# Patient Record
Sex: FEMALE | Race: WHITE | NOT HISPANIC OR LATINO | Employment: FULL TIME | URBAN - NONMETROPOLITAN AREA
[De-identification: names, ages, dates, MRNs, and addresses within clinical notes are randomized per-mention and may not be internally consistent; named-entity substitution may affect disease eponyms.]

---

## 2021-01-12 ENCOUNTER — IMMUNIZATION (OUTPATIENT)
Dept: VACCINE CLINIC | Facility: HOSPITAL | Age: 41
End: 2021-01-12

## 2021-01-12 PROCEDURE — 0001A: CPT | Performed by: FAMILY MEDICINE

## 2021-01-12 PROCEDURE — 91300 HC SARSCOV02 VAC 30MCG/0.3ML IM: CPT | Performed by: FAMILY MEDICINE

## 2021-02-01 ENCOUNTER — IMMUNIZATION (OUTPATIENT)
Dept: VACCINE CLINIC | Facility: HOSPITAL | Age: 41
End: 2021-02-01

## 2021-02-01 PROCEDURE — 91300 HC SARSCOV02 VAC 30MCG/0.3ML IM: CPT | Performed by: FAMILY MEDICINE

## 2021-02-01 PROCEDURE — 0002A: CPT | Performed by: FAMILY MEDICINE

## 2024-08-19 ENCOUNTER — APPOINTMENT (OUTPATIENT)
Dept: GENERAL RADIOLOGY | Facility: HOSPITAL | Age: 44
End: 2024-08-19
Payer: COMMERCIAL

## 2024-08-19 ENCOUNTER — HOSPITAL ENCOUNTER (OUTPATIENT)
Facility: HOSPITAL | Age: 44
Setting detail: OBSERVATION
Discharge: HOME | End: 2024-08-20
Attending: EMERGENCY MEDICINE | Admitting: FAMILY MEDICINE
Payer: COMMERCIAL

## 2024-08-19 DIAGNOSIS — R07.9 CHEST PAIN IN ADULT: Primary | ICD-10-CM

## 2024-08-19 PROBLEM — I25.10 CAD (CORONARY ARTERY DISEASE): Status: ACTIVE | Noted: 2024-08-19

## 2024-08-19 PROBLEM — I25.42 SPONTANEOUS DISSECTION OF CORONARY ARTERY: Status: ACTIVE | Noted: 2024-08-19

## 2024-08-19 PROBLEM — R79.89 ELEVATED TROPONIN: Status: ACTIVE | Noted: 2024-08-19

## 2024-08-19 LAB
ALBUMIN SERPL-MCNC: 4.7 G/DL (ref 3.5–5.2)
ALBUMIN/GLOB SERPL: 1.6 G/DL
ALP SERPL-CCNC: 110 U/L (ref 39–117)
ALT SERPL W P-5'-P-CCNC: 24 U/L (ref 1–33)
ANION GAP SERPL CALCULATED.3IONS-SCNC: 12 MMOL/L (ref 5–15)
AST SERPL-CCNC: 25 U/L (ref 1–32)
BASOPHILS # BLD AUTO: 0.07 10*3/MM3 (ref 0–0.2)
BASOPHILS NFR BLD AUTO: 0.7 % (ref 0–1.5)
BILIRUB SERPL-MCNC: 0.2 MG/DL (ref 0–1.2)
BUN SERPL-MCNC: 11 MG/DL (ref 6–20)
BUN/CREAT SERPL: 13.8 (ref 7–25)
CALCIUM SPEC-SCNC: 9.3 MG/DL (ref 8.6–10.5)
CHLORIDE SERPL-SCNC: 101 MMOL/L (ref 98–107)
CO2 SERPL-SCNC: 27 MMOL/L (ref 22–29)
CREAT SERPL-MCNC: 0.8 MG/DL (ref 0.57–1)
D DIMER PPP FEU-MCNC: 0.47 MCGFEU/ML (ref 0–0.5)
DEPRECATED RDW RBC AUTO: 42.8 FL (ref 37–54)
EGFRCR SERPLBLD CKD-EPI 2021: 94.5 ML/MIN/1.73
EOSINOPHIL # BLD AUTO: 0.27 10*3/MM3 (ref 0–0.4)
EOSINOPHIL NFR BLD AUTO: 2.9 % (ref 0.3–6.2)
ERYTHROCYTE [DISTWIDTH] IN BLOOD BY AUTOMATED COUNT: 12.9 % (ref 12.3–15.4)
GEN 5 2HR TROPONIN T REFLEX: 69 NG/L
GLOBULIN UR ELPH-MCNC: 2.9 GM/DL
GLUCOSE SERPL-MCNC: 130 MG/DL (ref 65–99)
HCT VFR BLD AUTO: 39.9 % (ref 34–46.6)
HGB BLD-MCNC: 12.9 G/DL (ref 12–15.9)
HOLD SPECIMEN: NORMAL
IMM GRANULOCYTES # BLD AUTO: 0.06 10*3/MM3 (ref 0–0.05)
IMM GRANULOCYTES NFR BLD AUTO: 0.6 % (ref 0–0.5)
LIPASE SERPL-CCNC: 35 U/L (ref 13–60)
LYMPHOCYTES # BLD AUTO: 3 10*3/MM3 (ref 0.7–3.1)
LYMPHOCYTES NFR BLD AUTO: 32 % (ref 19.6–45.3)
MCH RBC QN AUTO: 29.4 PG (ref 26.6–33)
MCHC RBC AUTO-ENTMCNC: 32.3 G/DL (ref 31.5–35.7)
MCV RBC AUTO: 90.9 FL (ref 79–97)
MONOCYTES # BLD AUTO: 0.62 10*3/MM3 (ref 0.1–0.9)
MONOCYTES NFR BLD AUTO: 6.6 % (ref 5–12)
NEUTROPHILS NFR BLD AUTO: 5.35 10*3/MM3 (ref 1.7–7)
NEUTROPHILS NFR BLD AUTO: 57.2 % (ref 42.7–76)
NRBC BLD AUTO-RTO: 0 /100 WBC (ref 0–0.2)
NT-PROBNP SERPL-MCNC: 639.1 PG/ML (ref 0–450)
PLATELET # BLD AUTO: 417 10*3/MM3 (ref 140–450)
PMV BLD AUTO: 9.9 FL (ref 6–12)
POTASSIUM SERPL-SCNC: 3.7 MMOL/L (ref 3.5–5.2)
PROT SERPL-MCNC: 7.6 G/DL (ref 6–8.5)
QT INTERVAL: 360 MS
QTC INTERVAL: 442 MS
RBC # BLD AUTO: 4.39 10*6/MM3 (ref 3.77–5.28)
SODIUM SERPL-SCNC: 140 MMOL/L (ref 136–145)
TROPONIN T DELTA: -9 NG/L
TROPONIN T SERPL HS-MCNC: 78 NG/L
WBC NRBC COR # BLD AUTO: 9.37 10*3/MM3 (ref 3.4–10.8)
WHOLE BLOOD HOLD COAG: NORMAL
WHOLE BLOOD HOLD SPECIMEN: NORMAL

## 2024-08-19 PROCEDURE — 83880 ASSAY OF NATRIURETIC PEPTIDE: CPT | Performed by: EMERGENCY MEDICINE

## 2024-08-19 PROCEDURE — 85379 FIBRIN DEGRADATION QUANT: CPT | Performed by: EMERGENCY MEDICINE

## 2024-08-19 PROCEDURE — 80053 COMPREHEN METABOLIC PANEL: CPT | Performed by: EMERGENCY MEDICINE

## 2024-08-19 PROCEDURE — 25010000002 ENOXAPARIN PER 10 MG: Performed by: EMERGENCY MEDICINE

## 2024-08-19 PROCEDURE — 85025 COMPLETE CBC W/AUTO DIFF WBC: CPT | Performed by: EMERGENCY MEDICINE

## 2024-08-19 PROCEDURE — 25810000003 LACTATED RINGERS SOLUTION: Performed by: EMERGENCY MEDICINE

## 2024-08-19 PROCEDURE — 99223 1ST HOSP IP/OBS HIGH 75: CPT | Performed by: INTERNAL MEDICINE

## 2024-08-19 PROCEDURE — 99285 EMERGENCY DEPT VISIT HI MDM: CPT

## 2024-08-19 PROCEDURE — 96372 THER/PROPH/DIAG INJ SC/IM: CPT

## 2024-08-19 PROCEDURE — 93005 ELECTROCARDIOGRAM TRACING: CPT | Performed by: EMERGENCY MEDICINE

## 2024-08-19 PROCEDURE — 84484 ASSAY OF TROPONIN QUANT: CPT | Performed by: EMERGENCY MEDICINE

## 2024-08-19 PROCEDURE — 36415 COLL VENOUS BLD VENIPUNCTURE: CPT

## 2024-08-19 PROCEDURE — 83690 ASSAY OF LIPASE: CPT | Performed by: EMERGENCY MEDICINE

## 2024-08-19 PROCEDURE — 99418 PROLNG IP/OBS E/M EA 15 MIN: CPT | Performed by: INTERNAL MEDICINE

## 2024-08-19 PROCEDURE — 71045 X-RAY EXAM CHEST 1 VIEW: CPT

## 2024-08-19 RX ORDER — ASPIRIN 81 MG/1
81 TABLET, CHEWABLE ORAL DAILY
COMMUNITY
Start: 2024-08-10 | End: 2024-11-08

## 2024-08-19 RX ORDER — MORPHINE SULFATE 2 MG/ML
2 INJECTION, SOLUTION INTRAMUSCULAR; INTRAVENOUS
Status: DISCONTINUED | OUTPATIENT
Start: 2024-08-19 | End: 2024-08-20 | Stop reason: HOSPADM

## 2024-08-19 RX ORDER — HYDROXYZINE HYDROCHLORIDE 25 MG/1
25 TABLET, FILM COATED ORAL ONCE
Status: COMPLETED | OUTPATIENT
Start: 2024-08-19 | End: 2024-08-19

## 2024-08-19 RX ORDER — NITROGLYCERIN 0.4 MG/1
0.4 TABLET SUBLINGUAL
COMMUNITY
Start: 2024-08-09

## 2024-08-19 RX ORDER — ATORVASTATIN CALCIUM 40 MG/1
40 TABLET, FILM COATED ORAL DAILY
COMMUNITY
Start: 2024-08-10 | End: 2024-11-08

## 2024-08-19 RX ORDER — ASPIRIN 81 MG/1
324 TABLET, CHEWABLE ORAL ONCE
Status: COMPLETED | OUTPATIENT
Start: 2024-08-19 | End: 2024-08-19

## 2024-08-19 RX ORDER — NITROGLYCERIN 0.4 MG/1
0.4 TABLET SUBLINGUAL
Status: DISCONTINUED | OUTPATIENT
Start: 2024-08-19 | End: 2024-08-20

## 2024-08-19 RX ORDER — NITROGLYCERIN 20 MG/100ML
5-200 INJECTION INTRAVENOUS
Status: DISCONTINUED | OUTPATIENT
Start: 2024-08-19 | End: 2024-08-19

## 2024-08-19 RX ORDER — ENOXAPARIN SODIUM 100 MG/ML
1 INJECTION SUBCUTANEOUS ONCE
Status: COMPLETED | OUTPATIENT
Start: 2024-08-19 | End: 2024-08-19

## 2024-08-19 RX ORDER — ESCITALOPRAM OXALATE 10 MG/1
10 TABLET ORAL DAILY
COMMUNITY
Start: 2024-01-25

## 2024-08-19 RX ORDER — SODIUM CHLORIDE 9 MG/ML
75 INJECTION, SOLUTION INTRAVENOUS CONTINUOUS
Status: DISCONTINUED | OUTPATIENT
Start: 2024-08-20 | End: 2024-08-20 | Stop reason: HOSPADM

## 2024-08-19 RX ORDER — SODIUM CHLORIDE 0.9 % (FLUSH) 0.9 %
10 SYRINGE (ML) INJECTION AS NEEDED
Status: DISCONTINUED | OUTPATIENT
Start: 2024-08-19 | End: 2024-08-20 | Stop reason: HOSPADM

## 2024-08-19 RX ORDER — BUPROPION HYDROCHLORIDE 150 MG/1
150 TABLET, EXTENDED RELEASE ORAL DAILY
COMMUNITY

## 2024-08-19 RX ADMIN — ENOXAPARIN SODIUM 70 MG: 80 INJECTION SUBCUTANEOUS at 22:47

## 2024-08-19 RX ADMIN — SODIUM CHLORIDE, POTASSIUM CHLORIDE, SODIUM LACTATE AND CALCIUM CHLORIDE 1000 ML: 600; 310; 30; 20 INJECTION, SOLUTION INTRAVENOUS at 22:00

## 2024-08-19 RX ADMIN — NITROGLYCERIN 0.4 MG: 0.4 TABLET SUBLINGUAL at 21:05

## 2024-08-19 RX ADMIN — ASPIRIN 324 MG: 81 TABLET, CHEWABLE ORAL at 19:48

## 2024-08-19 RX ADMIN — HYDROXYZINE HYDROCHLORIDE 25 MG: 25 TABLET, FILM COATED ORAL at 21:38

## 2024-08-19 NOTE — Clinical Note
Level of Care: Telemetry [5]   Diagnosis: Chest pain [500480]   Admitting Physician: GABY BROWN III [[id number]]   Attending Physician: GABY BROWN III [[id number]]   Bed Request Comments: tele obs not cdu

## 2024-08-19 NOTE — ED PROVIDER NOTES
Subjective   History of Present Illness  Patient presents for evaluation of symptoms including chest pain radiating to the left arm described as a crushing sensation.  This is similar to a recent event that she had a couple of weeks ago and was subsequently diagnosed with a spontaneous coronary artery dissection and underwent stent placement at [facility] in [facility] [place] [facility].  Patient had not had recurrent symptoms until today.  It happened not long after she was doing Pilates but she was not physically exerting herself specifically at the time the pain started.  She has had her aspirin and her Plavix today and has been compliant with his medications.  Has not taken any other medicines tonight.  No difficulty breathing.    History provided by:  Patient      Review of Systems    Past Medical History:   Diagnosis Date    History of hepatitis A vaccination     History of hepatitis B vaccination     Hx of esophagogastroduodenoscopy     - normal with the exception of hypotensive LES    Transverse sinus thrombosis     - etiology ocp's    Varicella infection, resolved        No Known Allergies    Past Surgical History:   Procedure Laterality Date     SECTION      10/11 and     RHINOPLASTY          TUBAL ABDOMINAL LIGATION             Family History   Problem Relation Age of Onset    Basal cell carcinoma Mother     Diabetes Mother         borderline    Hyperlipidemia Mother     Colon polyps Father     Diabetes type II Father     Hypertension Father     Stomach cancer Maternal Grandfather        Social History     Socioeconomic History    Marital status:    Tobacco Use    Smoking status: Never    Smokeless tobacco: Never   Substance and Sexual Activity    Alcohol use: Yes     Alcohol/week: 1.0 standard drink of alcohol     Types: 1 Glasses of wine per week    Drug use: Never    Sexual activity: Defer           Objective   Physical Exam  Constitutional:       General: She is not in  acute distress.  HENT:      Head: Normocephalic and atraumatic.   Eyes:      Conjunctiva/sclera: Conjunctivae normal.      Pupils: Pupils are equal, round, and reactive to light.   Cardiovascular:      Rate and Rhythm: Normal rate and regular rhythm.      Pulses: Normal pulses.      Heart sounds: No murmur heard.     No gallop.   Pulmonary:      Effort: Pulmonary effort is normal. No respiratory distress.   Abdominal:      General: Abdomen is flat. There is no distension.      Tenderness: There is no abdominal tenderness. There is no guarding or rebound.   Musculoskeletal:         General: No swelling or deformity. Normal range of motion.   Skin:     General: Skin is warm and dry.      Capillary Refill: Capillary refill takes less than 2 seconds.   Neurological:      General: No focal deficit present.      Mental Status: She is alert and oriented to person, place, and time.   Psychiatric:         Mood and Affect: Mood normal.         Behavior: Behavior normal.         Procedures           ED Course  ED Course as of 08/19/24 2346   Mon Aug 19, 2024   1944 Twelve-lead ECG independently interpreted by myself demonstrates normal sinus rhythm, no ST segment elevation or depression.  Q waves in lead III. [KB]   2156 Laboratory workup independently interpreted by myself notable for elevated high-sensitivity troponin, elevated proBNP [KB]   2156 Chest x-ray independently interpreted by myself demonstrates no acute cardiopulmonary abnormality [KB]      ED Course User Index  [KB] Roshan Pate MD                HEART Score: 5                              Medical Decision Making  Differential diagnosis includes acute coronary syndrome, pneumonia, pneumothorax, aortic dissection, pulmonary embolism.  Lab and imaging studies were conducted.  Aspirin 325 mg was administered and sublingual nitroglycerin was given.    Patient remains clinically stable in the emergency department.  She did have some improvement but not resolution  of her symptoms after administration of aspirin and nitro.  She will be admitted for further management    Problems Addressed:  Chest pain in adult: complicated acute illness or injury that poses a threat to life or bodily functions    Amount and/or Complexity of Data Reviewed  Independent Historian:      Details: Patient's  at the bedside provides some details of HPI  External Data Reviewed: notes.     Details: 8/10/2024 reviewed most recent discharge summary where patient was hospitalized for acute coronary syndrome likely due to a spontaneous coronary artery dissection it does not seem like this was a definitive diagnosis based on patient's discharge summary  Labs: ordered. Decision-making details documented in ED Course.  Radiology: ordered and independent interpretation performed. Decision-making details documented in ED Course.  ECG/medicine tests: ordered and independent interpretation performed. Decision-making details documented in ED Course.  Discussion of management or test interpretation with external provider(s): Consulted with on-call cardiologist who recommended administration of therapeutic Lovenox, nitroglycerin drip, admission to the hospital.  Lovenox was ordered and administered.  Given patient's low normal blood pressures I do not believe she would tolerate a nitroglycerin drip at this time but will start if blood pressures rise.  Hospital medicine consulted for admission    Risk  OTC drugs.  Prescription drug management.  Decision regarding hospitalization.        Final diagnoses:   Chest pain in adult       ED Disposition  ED Disposition       ED Disposition   Decision to Admit    Condition   --    Comment   --             Recent Results (from the past 24 hour(s))   ECG 12 Lead ED Triage Standing Order; Chest Pain    Collection Time: 08/19/24  7:41 PM   Result Value Ref Range    QT Interval 360 ms    QTC Interval 442 ms   High Sensitivity Troponin T    Collection Time: 08/19/24  7:47 PM     Specimen: Blood   Result Value Ref Range    HS Troponin T 78 (C) <14 ng/L   Comprehensive Metabolic Panel    Collection Time: 08/19/24  7:47 PM    Specimen: Blood   Result Value Ref Range    Glucose 130 (H) 65 - 99 mg/dL    BUN 11 6 - 20 mg/dL    Creatinine 0.80 0.57 - 1.00 mg/dL    Sodium 140 136 - 145 mmol/L    Potassium 3.7 3.5 - 5.2 mmol/L    Chloride 101 98 - 107 mmol/L    CO2 27.0 22.0 - 29.0 mmol/L    Calcium 9.3 8.6 - 10.5 mg/dL    Total Protein 7.6 6.0 - 8.5 g/dL    Albumin 4.7 3.5 - 5.2 g/dL    ALT (SGPT) 24 1 - 33 U/L    AST (SGOT) 25 1 - 32 U/L    Alkaline Phosphatase 110 39 - 117 U/L    Total Bilirubin 0.2 0.0 - 1.2 mg/dL    Globulin 2.9 gm/dL    A/G Ratio 1.6 g/dL    BUN/Creatinine Ratio 13.8 7.0 - 25.0    Anion Gap 12.0 5.0 - 15.0 mmol/L    eGFR 94.5 >60.0 mL/min/1.73   Lipase    Collection Time: 08/19/24  7:47 PM    Specimen: Blood   Result Value Ref Range    Lipase 35 13 - 60 U/L   BNP    Collection Time: 08/19/24  7:47 PM    Specimen: Blood   Result Value Ref Range    proBNP 639.1 (H) 0.0 - 450.0 pg/mL   Green Top (Gel)    Collection Time: 08/19/24  7:47 PM   Result Value Ref Range    Extra Tube Hold for add-ons.    Lavender Top    Collection Time: 08/19/24  7:47 PM   Result Value Ref Range    Extra Tube hold for add-on    Gold Top - SST    Collection Time: 08/19/24  7:47 PM   Result Value Ref Range    Extra Tube Hold for add-ons.    Gray Top    Collection Time: 08/19/24  7:47 PM   Result Value Ref Range    Extra Tube Hold for add-ons.    Light Blue Top    Collection Time: 08/19/24  7:47 PM   Result Value Ref Range    Extra Tube Hold for add-ons.    CBC Auto Differential    Collection Time: 08/19/24  7:47 PM    Specimen: Blood   Result Value Ref Range    WBC 9.37 3.40 - 10.80 10*3/mm3    RBC 4.39 3.77 - 5.28 10*6/mm3    Hemoglobin 12.9 12.0 - 15.9 g/dL    Hematocrit 39.9 34.0 - 46.6 %    MCV 90.9 79.0 - 97.0 fL    MCH 29.4 26.6 - 33.0 pg    MCHC 32.3 31.5 - 35.7 g/dL    RDW 12.9 12.3 - 15.4 %     RDW-SD 42.8 37.0 - 54.0 fl    MPV 9.9 6.0 - 12.0 fL    Platelets 417 140 - 450 10*3/mm3    Neutrophil % 57.2 42.7 - 76.0 %    Lymphocyte % 32.0 19.6 - 45.3 %    Monocyte % 6.6 5.0 - 12.0 %    Eosinophil % 2.9 0.3 - 6.2 %    Basophil % 0.7 0.0 - 1.5 %    Immature Grans % 0.6 (H) 0.0 - 0.5 %    Neutrophils, Absolute 5.35 1.70 - 7.00 10*3/mm3    Lymphocytes, Absolute 3.00 0.70 - 3.10 10*3/mm3    Monocytes, Absolute 0.62 0.10 - 0.90 10*3/mm3    Eosinophils, Absolute 0.27 0.00 - 0.40 10*3/mm3    Basophils, Absolute 0.07 0.00 - 0.20 10*3/mm3    Immature Grans, Absolute 0.06 (H) 0.00 - 0.05 10*3/mm3    nRBC 0.0 0.0 - 0.2 /100 WBC   D-dimer, Quantitative    Collection Time: 08/19/24  7:47 PM    Specimen: Blood   Result Value Ref Range    D-Dimer, Quantitative 0.47 0.00 - 0.50 MCGFEU/mL   ECG 12 Lead ED Triage Standing Order; Chest Pain    Collection Time: 08/19/24  9:47 PM   Result Value Ref Range    QT Interval 388 ms    QTC Interval 442 ms   High Sensitivity Troponin T 2Hr    Collection Time: 08/19/24  9:53 PM    Specimen: Blood   Result Value Ref Range    HS Troponin T 69 (C) <14 ng/L    Troponin T Delta -9 (L) >=-4 - <+4 ng/L     Note: In addition to lab results from this visit, the labs listed above may include labs taken at another facility or during a different encounter within the last 24 hours. Please correlate lab times with ED admission and discharge times for further clarification of the services performed during this visit.    XR Chest 1 View   Final Result   Impression:   No acute cardiopulmonary abnormality is identified         Electronically Signed: Coral Saab     8/19/2024 8:16 PM EDT     Workstation ID: [id number]        Vitals:    08/19/24 2230 08/19/24 2258 08/19/24 2313 08/19/24 2332   BP: 90/60 95/51 96/60 91/64   Pulse: 75 75 73 72   Resp:       Temp:       SpO2: 97% 97% 96% 96%   Weight:       Height:         Medications   sodium chloride 0.9 % flush 10 mL (has no administration in time range)    nitroglycerin (NITROSTAT) SL tablet 0.4 mg (0.4 mg Sublingual Given 8/19/24 2105)   nitroglycerin (TRIDIL) 200 mcg/ml infusion (0 mcg/min Intravenous Hold 8/19/24 2248)   aspirin chewable tablet 324 mg (324 mg Oral Given 8/19/24 1948)   hydrOXYzine (ATARAX) tablet 25 mg (25 mg Oral Given 8/19/24 2138)   lactated ringers bolus 1,000 mL (0 mL Intravenous Stopped 8/19/24 2320)   Enoxaparin Sodium (LOVENOX) syringe 70 mg (70 mg Subcutaneous Given 8/19/24 2247)     ECG/EMG Results (last 24 hours)       Procedure Component Value Units Date/Time    ECG 12 Lead ED Triage Standing Order; Chest Pain [[id number]] Collected: 08/19/24 1941     Updated: 08/19/24 1945     QT Interval 360 ms      QTC Interval 442 ms     Narrative:      Test Reason : ED Triage Standing Order~  Blood Pressure :   */*   mmHG  Vent. Rate :  91 BPM     Atrial Rate :  91 BPM     P-R Int : 136 ms          QRS Dur :  82 ms      QT Int : 360 ms       P-R-T Axes :   1  35  41 degrees     QTc Int : 442 ms    Normal sinus rhythm  Low voltage QRS  Possible Inferior infarct , age undetermined  Cannot rule out Anterior infarct , age undetermined  Abnormal ECG  When compared with ECG of 24-MAY-2016 00:08,  Minimal criteria for Anterior infarct are now present  Borderline criteria for Inferior infarct are now present  T wave inversion no longer evident in Inferior leads  Confirmed by MD DANIEL KYLE ([id number]) on 8/19/2024 7:45:04 PM    Referred By: EDMD           Confirmed By: CORAL DANIEL MD    ECG 12 Lead ED Triage Standing Order; Chest Pain [[id number]] Collected: 08/19/24 2147     Updated: 08/19/24 2147     QT Interval 388 ms      QTC Interval 442 ms     Narrative:      Test Reason : ED Triage Standing Order~  Blood Pressure :   */*   mmHG  Vent. Rate :  78 BPM     Atrial Rate :  78 BPM     P-R Int : 146 ms          QRS Dur :  92 ms      QT Int : 388 ms       P-R-T Axes : -11  31  43 degrees     QTc Int : 442 ms    Normal sinus rhythm  Septal infarct (cited on  or before 19-AUG-2024)  Possible Inferior infarct (cited on or before 19-AUG-2024)  Abnormal ECG  When compared with ECG of 19-AUG-2024 19:41,  Questionable change in initial forces of Anteroseptal leads    Referred By: EDMD           Confirmed By:           ECG 12 Lead ED Triage Standing Order; Chest Pain   Preliminary Result   Test Reason : ED Triage Standing Order~   Blood Pressure :   */*   mmHG   Vent. Rate :  78 BPM     Atrial Rate :  78 BPM      P-R Int : 146 ms          QRS Dur :  92 ms       QT Int : 388 ms       P-R-T Axes : -11  31  43 degrees      QTc Int : 442 ms      Normal sinus rhythm   Septal infarct (cited on or before 19-AUG-2024)   Possible Inferior infarct (cited on or before 19-AUG-2024)   Abnormal ECG   When compared with ECG of 19-AUG-2024 19:41,   Questionable change in initial forces of Anteroseptal leads      Referred By: EDMD           Confirmed By:       ECG 12 Lead ED Triage Standing Order; Chest Pain   Final Result   Test Reason : ED Triage Standing Order~   Blood Pressure :   */*   mmHG   Vent. Rate :  91 BPM     Atrial Rate :  91 BPM      P-R Int : 136 ms          QRS Dur :  82 ms       QT Int : 360 ms       P-R-T Axes :   1  35  41 degrees      QTc Int : 442 ms      Normal sinus rhythm   Low voltage QRS   Possible Inferior infarct , age undetermined   Cannot rule out Anterior infarct , age undetermined   Abnormal ECG   When compared with ECG of 24-MAY-2016 00:08,   Minimal criteria for Anterior infarct are now present   Borderline criteria for Inferior infarct are now present   T wave inversion no longer evident in Inferior leads   Confirmed by MD DANIEL KYLE ([id number]) on 8/19/2024 7:45:04 PM      Referred By: MACARENA           Confirmed By: CORAL DANIEL MD              No follow-up provider specified.       Medication List      No changes were made to your prescriptions during this visit.            Coral Daniel MD  08/19/24 9677

## 2024-08-20 ENCOUNTER — APPOINTMENT (OUTPATIENT)
Dept: CARDIOLOGY | Facility: HOSPITAL | Age: 44
End: 2024-08-20
Payer: COMMERCIAL

## 2024-08-20 VITALS
DIASTOLIC BLOOD PRESSURE: 51 MMHG | BODY MASS INDEX: 24.01 KG/M2 | WEIGHT: 153 LBS | OXYGEN SATURATION: 96 % | SYSTOLIC BLOOD PRESSURE: 87 MMHG | RESPIRATION RATE: 18 BRPM | HEART RATE: 77 BPM | TEMPERATURE: 97.7 F | HEIGHT: 67 IN

## 2024-08-20 PROBLEM — R79.89 ELEVATED TROPONIN: Status: RESOLVED | Noted: 2024-08-19 | Resolved: 2024-08-20

## 2024-08-20 PROBLEM — R07.9 CHEST PAIN: Status: RESOLVED | Noted: 2024-08-19 | Resolved: 2024-08-20

## 2024-08-20 PROBLEM — F41.8 ANXIETY ASSOCIATED WITH DEPRESSION: Status: ACTIVE | Noted: 2024-08-20

## 2024-08-20 LAB
ANION GAP SERPL CALCULATED.3IONS-SCNC: 7 MMOL/L (ref 5–15)
BASOPHILS # BLD AUTO: 0.06 10*3/MM3 (ref 0–0.2)
BASOPHILS NFR BLD AUTO: 0.7 % (ref 0–1.5)
BH CV ECHO MEAS - EDV(CUBED): 74.1 ML
BH CV ECHO MEAS - EDV(MOD-SP2): 126 ML
BH CV ECHO MEAS - EDV(MOD-SP4): 119 ML
BH CV ECHO MEAS - EF(MOD-BP): 46.7 %
BH CV ECHO MEAS - EF(MOD-SP2): 49.3 %
BH CV ECHO MEAS - EF(MOD-SP4): 43.8 %
BH CV ECHO MEAS - ESV(CUBED): 27 ML
BH CV ECHO MEAS - ESV(MOD-SP2): 63.9 ML
BH CV ECHO MEAS - ESV(MOD-SP4): 66.9 ML
BH CV ECHO MEAS - FS: 28.6 %
BH CV ECHO MEAS - IVS/LVPW: 1 CM
BH CV ECHO MEAS - IVSD: 0.9 CM
BH CV ECHO MEAS - LV DIASTOLIC VOL/BSA (35-75): 65.9 CM2
BH CV ECHO MEAS - LV MASS(C)D: 118.7 GRAMS
BH CV ECHO MEAS - LV SYSTOLIC VOL/BSA (12-30): 37.1 CM2
BH CV ECHO MEAS - LVIDD: 4.2 CM
BH CV ECHO MEAS - LVIDS: 3 CM
BH CV ECHO MEAS - LVPWD: 0.9 CM
BH CV ECHO MEAS - SV(MOD-SP2): 62.1 ML
BH CV ECHO MEAS - SV(MOD-SP4): 52.1 ML
BH CV ECHO MEAS - SVI(MOD-SP2): 34.4 ML/M2
BH CV ECHO MEAS - SVI(MOD-SP4): 28.9 ML/M2
BH CV VAS BP LEFT ARM: NORMAL MMHG
BUN SERPL-MCNC: 12 MG/DL (ref 6–20)
BUN/CREAT SERPL: 17.9 (ref 7–25)
CALCIUM SPEC-SCNC: 8.3 MG/DL (ref 8.6–10.5)
CHLORIDE SERPL-SCNC: 99 MMOL/L (ref 98–107)
CO2 SERPL-SCNC: 27 MMOL/L (ref 22–29)
CREAT SERPL-MCNC: 0.67 MG/DL (ref 0.57–1)
DEPRECATED RDW RBC AUTO: 42.7 FL (ref 37–54)
EGFRCR SERPLBLD CKD-EPI 2021: 112.1 ML/MIN/1.73
EOSINOPHIL # BLD AUTO: 0.3 10*3/MM3 (ref 0–0.4)
EOSINOPHIL NFR BLD AUTO: 3.5 % (ref 0.3–6.2)
ERYTHROCYTE [DISTWIDTH] IN BLOOD BY AUTOMATED COUNT: 12.9 % (ref 12.3–15.4)
GLUCOSE SERPL-MCNC: 84 MG/DL (ref 65–99)
HCT VFR BLD AUTO: 34.4 % (ref 34–46.6)
HGB BLD-MCNC: 11.1 G/DL (ref 12–15.9)
IMM GRANULOCYTES # BLD AUTO: 0.05 10*3/MM3 (ref 0–0.05)
IMM GRANULOCYTES NFR BLD AUTO: 0.6 % (ref 0–0.5)
LYMPHOCYTES # BLD AUTO: 3.35 10*3/MM3 (ref 0.7–3.1)
LYMPHOCYTES NFR BLD AUTO: 39.6 % (ref 19.6–45.3)
MAGNESIUM SERPL-MCNC: 1.8 MG/DL (ref 1.6–2.6)
MCH RBC QN AUTO: 29.3 PG (ref 26.6–33)
MCHC RBC AUTO-ENTMCNC: 32.3 G/DL (ref 31.5–35.7)
MCV RBC AUTO: 90.8 FL (ref 79–97)
MONOCYTES # BLD AUTO: 0.65 10*3/MM3 (ref 0.1–0.9)
MONOCYTES NFR BLD AUTO: 7.7 % (ref 5–12)
NEUTROPHILS NFR BLD AUTO: 4.05 10*3/MM3 (ref 1.7–7)
NEUTROPHILS NFR BLD AUTO: 47.9 % (ref 42.7–76)
NRBC BLD AUTO-RTO: 0 /100 WBC (ref 0–0.2)
PLATELET # BLD AUTO: 361 10*3/MM3 (ref 140–450)
PMV BLD AUTO: 9.9 FL (ref 6–12)
POTASSIUM SERPL-SCNC: 3.9 MMOL/L (ref 3.5–5.2)
QT INTERVAL: 388 MS
QTC INTERVAL: 442 MS
RBC # BLD AUTO: 3.79 10*6/MM3 (ref 3.77–5.28)
SODIUM SERPL-SCNC: 133 MMOL/L (ref 136–145)
TROPONIN T SERPL HS-MCNC: 63 NG/L
WBC NRBC COR # BLD AUTO: 8.46 10*3/MM3 (ref 3.4–10.8)

## 2024-08-20 PROCEDURE — 25810000003 SODIUM CHLORIDE 0.9 % SOLUTION: Performed by: NURSE PRACTITIONER

## 2024-08-20 PROCEDURE — G0378 HOSPITAL OBSERVATION PER HR: HCPCS

## 2024-08-20 PROCEDURE — 99204 OFFICE O/P NEW MOD 45 MIN: CPT | Performed by: INTERNAL MEDICINE

## 2024-08-20 PROCEDURE — 25010000002 MORPHINE PER 10 MG: Performed by: INTERNAL MEDICINE

## 2024-08-20 PROCEDURE — 96374 THER/PROPH/DIAG INJ IV PUSH: CPT

## 2024-08-20 PROCEDURE — 25010000002 SULFUR HEXAFLUORIDE MICROSPH 60.7-25 MG RECONSTITUTED SUSPENSION: Performed by: PHYSICIAN ASSISTANT

## 2024-08-20 PROCEDURE — 84484 ASSAY OF TROPONIN QUANT: CPT | Performed by: FAMILY MEDICINE

## 2024-08-20 PROCEDURE — 83735 ASSAY OF MAGNESIUM: CPT | Performed by: NURSE PRACTITIONER

## 2024-08-20 PROCEDURE — 93308 TTE F-UP OR LMTD: CPT

## 2024-08-20 PROCEDURE — 93308 TTE F-UP OR LMTD: CPT | Performed by: INTERNAL MEDICINE

## 2024-08-20 PROCEDURE — 80048 BASIC METABOLIC PNL TOTAL CA: CPT | Performed by: NURSE PRACTITIONER

## 2024-08-20 PROCEDURE — 85025 COMPLETE CBC W/AUTO DIFF WBC: CPT | Performed by: NURSE PRACTITIONER

## 2024-08-20 PROCEDURE — 99239 HOSP IP/OBS DSCHRG MGMT >30: CPT | Performed by: FAMILY MEDICINE

## 2024-08-20 RX ORDER — ONDANSETRON 2 MG/ML
4 INJECTION INTRAMUSCULAR; INTRAVENOUS EVERY 6 HOURS PRN
Status: DISCONTINUED | OUTPATIENT
Start: 2024-08-20 | End: 2024-08-20 | Stop reason: HOSPADM

## 2024-08-20 RX ORDER — SODIUM CHLORIDE 0.9 % (FLUSH) 0.9 %
10 SYRINGE (ML) INJECTION EVERY 12 HOURS SCHEDULED
Status: DISCONTINUED | OUTPATIENT
Start: 2024-08-20 | End: 2024-08-20 | Stop reason: HOSPADM

## 2024-08-20 RX ORDER — ESCITALOPRAM OXALATE 10 MG/1
10 TABLET ORAL DAILY
Status: DISCONTINUED | OUTPATIENT
Start: 2024-08-20 | End: 2024-08-20 | Stop reason: HOSPADM

## 2024-08-20 RX ORDER — BISACODYL 10 MG
10 SUPPOSITORY, RECTAL RECTAL DAILY PRN
Status: DISCONTINUED | OUTPATIENT
Start: 2024-08-20 | End: 2024-08-20 | Stop reason: HOSPADM

## 2024-08-20 RX ORDER — SODIUM CHLORIDE 9 MG/ML
40 INJECTION, SOLUTION INTRAVENOUS AS NEEDED
Status: DISCONTINUED | OUTPATIENT
Start: 2024-08-20 | End: 2024-08-20 | Stop reason: HOSPADM

## 2024-08-20 RX ORDER — BUPROPION HYDROCHLORIDE 150 MG/1
150 TABLET, EXTENDED RELEASE ORAL DAILY
Status: DISCONTINUED | OUTPATIENT
Start: 2024-08-20 | End: 2024-08-20 | Stop reason: HOSPADM

## 2024-08-20 RX ORDER — BISACODYL 5 MG/1
5 TABLET, DELAYED RELEASE ORAL DAILY PRN
Status: DISCONTINUED | OUTPATIENT
Start: 2024-08-20 | End: 2024-08-20 | Stop reason: HOSPADM

## 2024-08-20 RX ORDER — ATORVASTATIN CALCIUM 40 MG/1
40 TABLET, FILM COATED ORAL NIGHTLY
Status: DISCONTINUED | OUTPATIENT
Start: 2024-08-20 | End: 2024-08-20 | Stop reason: HOSPADM

## 2024-08-20 RX ORDER — AMOXICILLIN 250 MG
2 CAPSULE ORAL 2 TIMES DAILY PRN
Status: DISCONTINUED | OUTPATIENT
Start: 2024-08-20 | End: 2024-08-20 | Stop reason: HOSPADM

## 2024-08-20 RX ORDER — POLYETHYLENE GLYCOL 3350 17 G/17G
17 POWDER, FOR SOLUTION ORAL DAILY PRN
Status: DISCONTINUED | OUTPATIENT
Start: 2024-08-20 | End: 2024-08-20 | Stop reason: HOSPADM

## 2024-08-20 RX ORDER — CETIRIZINE HYDROCHLORIDE 10 MG/1
10 TABLET ORAL DAILY
Status: DISCONTINUED | OUTPATIENT
Start: 2024-08-20 | End: 2024-08-20 | Stop reason: HOSPADM

## 2024-08-20 RX ORDER — SODIUM CHLORIDE 0.9 % (FLUSH) 0.9 %
10 SYRINGE (ML) INJECTION AS NEEDED
Status: DISCONTINUED | OUTPATIENT
Start: 2024-08-20 | End: 2024-08-20 | Stop reason: HOSPADM

## 2024-08-20 RX ORDER — ACETAMINOPHEN 325 MG/1
650 TABLET ORAL EVERY 4 HOURS PRN
Status: DISCONTINUED | OUTPATIENT
Start: 2024-08-20 | End: 2024-08-20 | Stop reason: HOSPADM

## 2024-08-20 RX ORDER — ASPIRIN 81 MG/1
81 TABLET, CHEWABLE ORAL DAILY
Status: DISCONTINUED | OUTPATIENT
Start: 2024-08-20 | End: 2024-08-20 | Stop reason: HOSPADM

## 2024-08-20 RX ADMIN — SODIUM CHLORIDE 500 ML: 9 INJECTION, SOLUTION INTRAVENOUS at 01:38

## 2024-08-20 RX ADMIN — BUPROPION HYDROCHLORIDE 150 MG: 150 TABLET, EXTENDED RELEASE ORAL at 08:39

## 2024-08-20 RX ADMIN — Medication 10 ML: at 09:00

## 2024-08-20 RX ADMIN — TICAGRELOR 90 MG: 90 TABLET ORAL at 14:20

## 2024-08-20 RX ADMIN — CETIRIZINE HYDROCHLORIDE 10 MG: 10 TABLET, FILM COATED ORAL at 08:39

## 2024-08-20 RX ADMIN — SULFUR HEXAFLUORIDE 3 ML: KIT at 13:37

## 2024-08-20 RX ADMIN — ASPIRIN 81 MG 81 MG: 81 TABLET ORAL at 14:20

## 2024-08-20 RX ADMIN — ATORVASTATIN CALCIUM 40 MG: 40 TABLET, FILM COATED ORAL at 01:43

## 2024-08-20 RX ADMIN — MORPHINE SULFATE 2 MG: 2 INJECTION, SOLUTION INTRAMUSCULAR; INTRAVENOUS at 01:51

## 2024-08-20 NOTE — NURSING NOTE
Pt. Referred for Phase II Cardiac Rehab. Staff discussed benefits of exercise, program protocol, and educational material provided. Teach back verified.    Patient reports she is already enrolled for Phase II Cardiac Rehab at .

## 2024-08-20 NOTE — CASE MANAGEMENT/SOCIAL WORK
"Discharge Planning Assessment  [facility]     Patient Name: Susan Byers  MRN: [id number]  Today's Date: 8/20/2024    Admit Date: 8/19/2024    Plan: discharge plan   Discharge Needs Assessment       Row Name 08/20/24 1412       Living Environment    People in Home spouse;child(yuan), dependent    Name(s) of People in Home Amado(spouse) and 2 middle school children    Primary Care Provided by self    Provides Primary Care For child(yuan)    Family Caregiver if Needed spouse    Family Caregiver Names Amado Byers(spouse)    Quality of Family Relationships helpful;involved;supportive    Able to Return to Prior Arrangements yes    Living Arrangement Comments I met with pt and pt's spouse in room with permission regarding discharge plan. Pt resides in [facility] [place] with spouse and 2 children\"6th and 7th grade\"       Transition Planning    Patient/Family Anticipates Transition to home with family    Patient/Family Anticipated Services at Transition     Transportation Anticipated family or friend will provide       Discharge Needs Assessment    Readmission Within the Last 30 Days no previous admission in last 30 days    Equipment Currently Used at Home none    Concerns to be Addressed discharge planning    Equipment Needed After Discharge none    Discharge Coordination/Progress Pt confirms that she has [employer] with prescription coverage and uses [facility] on [address] in [place]. Pt is independent with ADLs and uses no DME or HH. Pt is here with c/os chest pain and cardiology has been consulted. Per discussion in [facility], pt is having an echo today. Discharge plan is home and pt does not anticipate discharge needs. CM will cont to follow                   Discharge Plan       Row Name 08/20/24 1000       Plan    Plan discharge plan    Plan Comments Pt is here with c/os chest pain and cardiology has been consulted. Per discussion in [facility], pt is having an echo today. Discharge plan is home and pt does " not anticipate discharge needs. CM will cont to follow    Final Discharge Disposition Code 01 - home or self-care                  Continued Care and Services - Admitted Since 8/19/2024    No active coordination exists for this encounter.       Expected Discharge Date and Time       Expected Discharge Date Expected Discharge Time    Aug 20, 2024            Demographic Summary       Row Name 08/20/24 1411       General Information    General Information Comments PCP is Micky       Contact Information    Permission Granted to Share Info With     Contact Information Obtained for     Contact Information Comments Amado Byers(spouse) [phone]                   Functional Status       Row Name 08/20/24 1412       Functional Status    Functional Status Comments Pt is independent with ADLs                   Psychosocial    No documentation.                  Abuse/Neglect    No documentation.                  Legal    No documentation.                  Substance Abuse    No documentation.                  Patient Forms    No documentation.                     Claudia Meyers RN

## 2024-08-20 NOTE — ED NOTES
Susan Byers    Nursing Report ED to Floor:  Mental status: A&OX4  Ambulatory status: ADLIB  Oxygen Therapy:  NSR  Cardiac Rhythm: RA  Admitted from: HOME  Safety Concerns:  NA  Social Issues: NA  ED Room #:  09    ED Nurse Phone Extension - [phone] or may call [phone].      HPI:   Chief Complaint   Patient presents with    Chest Pain       Past Medical History:  Past Medical History:   Diagnosis Date    History of hepatitis A vaccination     History of hepatitis B vaccination     Hx of esophagogastroduodenoscopy     - normal with the exception of hypotensive LES    Mood disorder     Transverse sinus thrombosis     - etiology ocp's    Varicella infection, resolved         Past Surgical History:  Past Surgical History:   Procedure Laterality Date    CARDIAC CATHETERIZATION  2024    Successful IVUS guided PTCA and drug-eluting stent placement x 1 to the mid LAD for what is most likely SCAD. This was complicated by vessel perforation, likely into a nearby fistula, Which was sealed with the use of a covered stent.     SECTION      10/11 and     RHINOPLASTY          TUBAL ABDOMINAL LIGATION              Admitting Doctor:   Alfredo Stock III, DO    Consulting Provider(s):  Consults       No orders found for last 30 day(s).             Admitting Diagnosis:   The encounter diagnosis was Chest pain in adult.    Most Recent Vitals:   Vitals:    24 2332 24 2336 24 2346 24 0001   BP: 91/64 95/63 92/48 91/54   Pulse: 72 70 74 72   Resp:       Temp:       SpO2: 96% 96% 95% 96%   Weight:       Height:           Active LDAs/IV Access:   Lines, Drains & Airways       Active LDAs       Name Placement date Placement time Site Days    Peripheral IV 24 Left Antecubital 24  Antecubital  less than 1                    Labs (abnormal labs have a star):   Labs Reviewed   TROPONIN - Abnormal; Notable for the following components:       Result Value    HS  Troponin T 78 (*)     All other components within normal limits    Narrative:     High Sensitive Troponin T Reference Range:  <14.0 ng/L- Negative Female for AMI  <22.0 ng/L- Negative Male for AMI  >=14 - Abnormal Female indicating possible myocardial injury.  >=22 - Abnormal Male indicating possible myocardial injury.   Clinicians would have to utilize clinical acumen, EKG, Troponin, and serial changes to determine if it is an Acute Myocardial Infarction or myocardial injury due to an underlying chronic condition.        COMPREHENSIVE METABOLIC PANEL - Abnormal; Notable for the following components:    Glucose 130 (*)     All other components within normal limits    Narrative:     GFR Normal >60  Chronic Kidney Disease <60  Kidney Failure <15     BNP (IN-HOUSE) - Abnormal; Notable for the following components:    proBNP 639.1 (*)     All other components within normal limits    Narrative:     This assay is used as an aid in the diagnosis of individuals suspected of having heart failure. It can be used as an aid in the diagnosis of acute decompensated heart failure (ADHF) in patients presenting with signs and symptoms of ADHF to the emergency department (ED). In addition, NT-proBNP of <300 pg/mL indicates ADHF is not likely.    Age Range Result Interpretation  NT-proBNP Concentration (pg/mL:      <50             Positive            >450                   Gray                 300-450                    Negative             <300    50-75           Positive            >900                  Gray                300-900                  Negative            <300      >75             Positive            >1800                  Gray                300-1800                  Negative            <300   CBC WITH AUTO DIFFERENTIAL - Abnormal; Notable for the following components:    Immature Grans % 0.6 (*)     Immature Grans, Absolute 0.06 (*)     All other components within normal limits   HIGH SENSITIVITIY TROPONIN T 2HR -  "Abnormal; Notable for the following components:    HS Troponin T 69 (*)     Troponin T Delta -9 (*)     All other components within normal limits    Narrative:     High Sensitive Troponin T Reference Range:  <14.0 ng/L- Negative Female for AMI  <22.0 ng/L- Negative Male for AMI  >=14 - Abnormal Female indicating possible myocardial injury.  >=22 - Abnormal Male indicating possible myocardial injury.   Clinicians would have to utilize clinical acumen, EKG, Troponin, and serial changes to determine if it is an Acute Myocardial Infarction or myocardial injury due to an underlying chronic condition.        LIPASE - Normal   D-DIMER, QUANTITATIVE - Normal    Narrative:     According to the assay 's published package insert, a normal (<0.50 MCGFEU/mL) D-dimer result in conjunction with a non-high clinical probability assessment, excludes deep vein thrombosis (DVT) and pulmonary embolism (PE) with high sensitivity.    D-dimer values increase with age and this can make VTE exclusion of an older population difficult. To address this, the American College of [employer], based on best available evidence and recent guidelines, recommends that clinicians use age-adjusted D-dimer thresholds in patients greater than 50 years of age with: a) a low probability of PE who do not meet all Pulmonary Embolism Rule Out Criteria, or b) in those with intermediate probability of PE.   The formula for an age-adjusted D-dimer cut-off is \"age/100\".  For example, a 60 year old patient would have an age-adjusted cut-off of 0.60 MCGFEU/mL and an 80 year old 0.80 MCGFEU/mL.   RAINBOW DRAW    Narrative:     The following orders were created for panel order [place] Draw.  Procedure                               Abnormality         Status                     ---------                               -----------         ------                     Green Top (Gel)[[id number]]                                  Final result               Lavender " Top[[id number]]                                     Final result               Gold Top - SST[[id number]]                                   Final result               Red Top[[id number]]                                         Final result               Light Blue Top[[id number]]                                   Final result                 Please view results for these tests on the individual orders.   CBC AND DIFFERENTIAL    Narrative:     The following orders were created for panel order CBC & Differential.  Procedure                               Abnormality         Status                     ---------                               -----------         ------                     CBC Auto Differential[[id number]]        Abnormal            Final result                 Please view results for these tests on the individual orders.   GREEN TOP   LAVENDER TOP   GOLD TOP - SST   GRAY TOP   LIGHT BLUE TOP       Meds Given in ED:   Medications   sodium chloride 0.9 % flush 10 mL (has no administration in time range)   sodium chloride 0.9 % infusion (has no administration in time range)   sodium chloride 0.9 % bolus 500 mL (has no administration in time range)   morphine injection 2 mg (has no administration in time range)   aspirin chewable tablet 324 mg (324 mg Oral Given 8/19/24 1948)   hydrOXYzine (ATARAX) tablet 25 mg (25 mg Oral Given 8/19/24 2138)   lactated ringers bolus 1,000 mL (0 mL Intravenous Stopped 8/19/24 2320)   Enoxaparin Sodium (LOVENOX) syringe 70 mg (70 mg Subcutaneous Given 8/19/24 2247)     sodium chloride, 75 mL/hr         Last NIH score:                                                          Dysphagia screening results:        [place] Coma Scale:  No data recorded     CIWA:        Restraint Type:            Isolation Status:  No active isolations

## 2024-08-20 NOTE — CONSULTS
[facility] Cardiology, Inpatient Consult  Date of Hospital Visit: 24  Encounter Provider: Moraima Angel PA-C    Place of Service: [facility]  Patient Name: Susan Byers  :1980  MRN: [id number]     Primary Care Provider: Provider, No Known    Chief complaint: Chest pain    PROBLEM LIST  SCAD  NSTEMI 2024 [facility] 2024: LAD stenosis likely secondary to SCAD, 2.5X 48 mm Synergy AZAR to LAD.  Extravasation of dye following stent, then 3.0 X15 Amna covered stent was placed to LAD  PFO  S/p [place] PFO occluder at , 2018  History of transverse sinus thrombosis    History of Present Illness:  Patient is a 44-year-old recent history of SCAD associated with severe onset of chest pain on .  Patient was seen at [facility] in [place] and underwent stenting to her LAD.  Patient was monitored for 48 hours after the stenting and while there was some extravasation during heart catheterization echo showed no signs of pericardial effusion.  Patient was discharged home on aspirin, Brilinta and statin therapy and patient was recommended for follow-up in 6 to 8 weeks.    Patient presented to the emergency department yesterday after having abrupt onset of chest pain after a Pilates class.  This was similar to her pain a couple of weeks ago when she had an NSTEMI and underwent heart catheterization showing spontaneous coronary dissection.  Patient was noted to have elevated troponin but downtrending 78-69-63.  Patient states she was given nitroglycerin and she feels as if it helped the chest discomfort.  She is no longer having chest pain.  She has been taking aspirin, Brilinta and Lipitor without missing any doses since her intervention on .      I reviewed patient's past medical history, surgical history, family history and social history.    Current Outpatient Medications   Medication Instructions    aspirin 81 mg, Oral, Daily     "atorvastatin (LIPITOR) 40 mg, Oral, Daily    buPROPion SR (WELLBUTRIN SR) 150 mg, Oral, Daily    escitalopram (LEXAPRO) 10 mg, Oral, Daily    fexofenadine (ALLEGRA ALLERGY) 180 MG tablet 1 tablet, Oral, Daily    nitroglycerin (NITROSTAT) 0.4 mg, Sublingual, Every 5 Minutes PRN    ticagrelor (BRILINTA) 90 mg, Oral, 2 Times Daily          Scheduled Meds:aspirin, 81 mg, Oral, Daily  atorvastatin, 40 mg, Oral, Nightly  buPROPion SR, 150 mg, Oral, Daily  cetirizine, 10 mg, Oral, Daily  escitalopram, 10 mg, Oral, Daily  sodium chloride, 10 mL, Intravenous, Q12H  ticagrelor, 90 mg, Oral, BID      Continuous Infusions:sodium chloride, 75 mL/hr, Last Rate: 75 mL/hr (08/20/24 0255)      Review of Systems   Pertinent positives and negatives noted in the HPI         Objective:     Vitals:    08/20/24 0125 08/20/24 0423 08/20/24 0720 08/20/24 0725   BP: 96/56 96/62 (!) 86/50 97/56   BP Location: Left arm Left arm Left arm Right arm   Patient Position: Lying Lying Lying Lying   Pulse: 71 74  72   Resp:  16  18   Temp:  97 °F (36.1 °C)  98.2 °F (36.8 °C)   TempSrc:  Oral  Oral   SpO2: 96%      Weight: 69.5 kg (153 lb 3.2 oz)      Height:           Body mass index is 23.99 kg/m².  Flowsheet Rows      Flowsheet Row First Filed Value   Admission Height 170.2 cm (67\") Documented at 08/19/2024 1933   Admission Weight 65.8 kg (145 lb) Documented at 08/19/2024 1933          No intake or output data in the 24 hours ending 08/20/24 0856    Vitals reviewed.   Constitutional:       Appearance: Healthy appearance. Not in distress.   HENT:    Mouth/Throat:      Pharynx: Oropharynx is clear.   Neck:      Vascular: No carotid bruit or JVD.   Pulmonary:      Effort: Pulmonary effort is normal.      Breath sounds: No decreased breath sounds. No wheezing. No rhonchi.   Cardiovascular:      Normal rate. Regular rhythm.      Murmurs: There is no murmur.      No rub.   Pulses:     Intact distal pulses.   Edema:     Peripheral edema absent. " "  Abdominal:      General: There is no distension.      Palpations: Abdomen is soft.      Tenderness: There is no abdominal tenderness.   Musculoskeletal:         General: No deformity. Skin:     General: Skin is warm and dry.   Neurological:      General: No focal deficit present.      Mental Status: Alert and oriented to person, place and time.           Lab Review:                CBC:      Lab 08/20/24 0353 08/19/24 1947   WBC 8.46 9.37   HEMOGLOBIN 11.1* 12.9   HEMATOCRIT 34.4 39.9   PLATELETS 361 417   NEUTROS ABS 4.05 5.35   IMMATURE GRANS (ABS) 0.05 0.06*   LYMPHS ABS 3.35* 3.00   MONOS ABS 0.65 0.62   EOS ABS 0.30 0.27   MCV 90.8 90.9     CMP:        Lab 08/20/24  0353 08/19/24 1947   SODIUM 133* 140   POTASSIUM 3.9 3.7   CHLORIDE 99 101   CO2 27.0 27.0   ANION GAP 7.0 12.0   BUN 12 11   CREATININE 0.67 0.80   EGFR 112.1 94.5   GLUCOSE 84 130*   CALCIUM 8.3* 9.3   MAGNESIUM 1.8  --    TOTAL PROTEIN  --  7.6   ALBUMIN  --  4.7   GLOBULIN  --  2.9   ALT (SGPT)  --  24   AST (SGOT)  --  25   BILIRUBIN  --  0.2   ALK PHOS  --  110   LIPASE  --  35     Results from last 7 days   Lab Units 08/20/24  0353   MAGNESIUM mg/dL 1.8     No results found for: \"HGBA1C\"  Estimated Creatinine Clearance: 120 mL/min (by C-G formula based on SCr of 0.67 mg/dL).          Lab 08/20/24  0353 08/19/24 2153 08/19/24 1947   PROBNP  --   --  639.1*   HSTROP T 63* 69* 78*       Lab Results   Component Value Date    CHLPL 166 01/21/2016    TRIG 78 01/21/2016    HDL 57 01/21/2016    LDL 90 01/21/2016         XR Chest 1 View    Result Date: 8/19/2024  Impression: No acute cardiopulmonary abnormality is identified Electronically Signed: Coral Saab  8/19/2024 8:16 PM EDT  Workstation ID: [id number]           EKG: Normal sinus rhythm, heart rate 91 bpm nonspecific ST changes.  Unable to see comparison EKG from [facility].    Result Review:  I have personally reviewed the results from the time of admission and agree with these " findings:  [x]  Laboratory  [x]  Radiology  []  EKG/Telemetry   []  Pathology  []  Old records  []  Other:             Assessment:   Chest pain, downtrending troponins  Spontaneous coronary artery dissection  [facility] with stent on 8/8  Continuing aspirin and Brilinta, patient has not missed dosages  Low blood pressure  Patient has had low blood pressure prior to this incident.  Did receive IV fluids last night.      Plan:   Patient chest pain-free this morning.   Limited echo shows the same ejection fraction 50 to 55% with apical and distal anterior septal hypokinesis.  No change from previous echo.  Continue aspirin 81 mg and Brilinta 90 mg twice daily after recent stent.  Continue atorvastatin 40 mg nightly for hyperlipidemia.  5.   Avoid strenuous activity or strenuous exercise for at least 3 months, avoid all high intensity training exercises to avoid shear stress on the coronaries.  Okay for walking or low impact aerobic exercise.    Okay for discharge home from cardiology standpoint, continue all current medications    Follow-up with Dr. Rehman or Trudy Goldman in 6-8 weeks.        Moraima Angel PA-C I discussed this case with Dr. Rehman.  Patient was examined by Dr. Rehman and appropriate changes were made to the note following his examination    I,Demond Rehman M.D., personally performed the services described in this documentation as scribed by the above named individual in my presence, and it is both accurate and complete.    Demond Rehman MD, formerly [facility], [facility] Cardiology  08/20/24  13:47 EDT

## 2024-08-20 NOTE — DISCHARGE SUMMARY
[facility] Medicine Services  DISCHARGE SUMMARY    Patient Name: Susan Byers  : 1980  MRN: [id number]    Date of Admission: 2024  7:57 PM  Date of Discharge:  2024  Primary Care Physician: Provider, No Known    Consults       Date and Time Order Name Status Description    2024  1:22 AM Inpatient Cardiology Consult Completed             Hospital Course     Presenting Problem: Chest pain     Active Hospital Problems    Diagnosis  POA    Anxiety associated with depression [F41.8]  Yes    Spontaneous dissection of coronary artery [I25.42]  Yes      Resolved Hospital Problems    Diagnosis Date Resolved POA    **Chest pain [R07.9] 2024 Yes    Elevated troponin [R79.89] 2024 Yes          Hospital Course:  Susan Byers is a 44 y.o. female w/ a hx of transverse sinus thrombosis (), anxiety, depression and recent spontaneous coronary artery dissection (s/p Successful IVUS guided PTCA and drug-eluting stent placement x 1 to the mid LAD; 2024) who presented to the ED w/ c/o chest pain. Patient transferred to my services on the AM of , seen by cardiology and made NPO with follow up Echo, reviewed later this afternoon and discussed with Dr. Rehman. Patient's symptoms have resolved, she reports overall doing well, plans for discharge to home this afternoon with outpatient cardiology follow up.      **Chest pain   **Elevated troponin   -recent admission to [facility] where pt underwent a cardiac cath on   -Cardiac cath 2024: 1. Successful IVUS guided PTCA and drug-eluting stent placement x 1 to the mid LAD for what is most likely SCAD. This was complicated by vessel perforation, likely into a nearby fistula, Which was sealed with the use of a covered stent.   -ECHO obtained on ; results above   - Repeat Echo at our facility and reviewed by Dr. Rehman  -pt d/c on statin, ASA, Brilinta (no BB 2/2 low BP) has persisted since previous [facility]     -developed Chest Pain today after Pilates prompting ED visit   -CXR unremarkable   -troponin 78, 69, 62   -proBNP 639.1  -s/p Lovenox 70mg given in ED  -s/p ASA 324mg given in ED; continue 81mg daily   -continue Lipitor, Brilinta      **Anxiety, Depression   -continue routine Wellbutrin, Lexapro       Discharge Follow Up Recommendations for outpatient labs/diagnostics:   Follow up with PCP in 1 week   Follow up with Cardiology/Dr. Rehman in next 6-8 weeks     Day of Discharge     HPI:   Patient is a 44-year-old female seen and examined by me this a.m. and again this afternoon following echo and recommendations by cardiology.  Patient's symptoms have now resolved and plans are for discharged home.  Has been advised to avoid strenuous activity for the next 3 months.  Outpatient follow-up with cardiology in the next 6 to 8 weeks      Vital Signs:   Temp:  [97 °F (36.1 °C)-98.3 °F (36.8 °C)] 97.7 °F (36.5 °C)  Heart Rate:  [70-86] 77  Resp:  [16-18] 18  BP: ()/(48-73) 87/51      Physical Exam:  Constitutional: No acute distress, awake, alert, resting comfortably in bed, on RA   HENT: NCAT, nares patent, mucous membranes moist  Respiratory: Clear to auscultation bilaterally, respiratory effort normal   Cardiovascular: RRR, no murmurs, rubs, or gallops  Gastrointestinal: Positive bowel sounds, soft, nontender, nondistended  Musculoskeletal: No bilateral ankle edema  Psychiatric: Appropriate affect, cooperative  Neurologic: Oriented x 3, strength symmetric in all extremities, Cranial Nerves grossly intact to confrontation, speech clear  Skin: No rashes      Pertinent  and/or Most Recent Results     LAB RESULTS:      Lab 08/20/24  0353 08/19/24  1947   WBC 8.46 9.37   HEMOGLOBIN 11.1* 12.9   HEMATOCRIT 34.4 39.9   PLATELETS 361 417   NEUTROS ABS 4.05 5.35   IMMATURE GRANS (ABS) 0.05 0.06*   LYMPHS ABS 3.35* 3.00   MONOS ABS 0.65 0.62   EOS ABS 0.30 0.27   MCV 90.8 90.9   D DIMER QUANT  --  0.47         Lab  08/20/24  0353 08/19/24 1947   SODIUM 133* 140   POTASSIUM 3.9 3.7   CHLORIDE 99 101   CO2 27.0 27.0   ANION GAP 7.0 12.0   BUN 12 11   CREATININE 0.67 0.80   EGFR 112.1 94.5   GLUCOSE 84 130*   CALCIUM 8.3* 9.3   MAGNESIUM 1.8  --          Lab 08/19/24 1947   TOTAL PROTEIN 7.6   ALBUMIN 4.7   GLOBULIN 2.9   ALT (SGPT) 24   AST (SGOT) 25   BILIRUBIN 0.2   ALK PHOS 110   LIPASE 35         Lab 08/20/24  0353 08/19/24  2153 08/19/24 1947   PROBNP  --   --  639.1*   HSTROP T 63* 69* 78*                 Brief Urine Lab Results       None          Microbiology Results (last 10 days)       ** No results found for the last 240 hours. **            Adult Transthoracic Echo Limited W/ Cont if Necessary Per Protocol    Result Date: 8/20/2024    Left ventricular systolic function is low normal. Left ventricular ejection fraction appears to be 51 - 55%.   The following left ventricular wall segments are hypokinetic: apical anterior, apical septal and apex hypokinetic. No significant change compared to previous study from 8/8/2024     XR Chest 1 View    Result Date: 8/19/2024  XR CHEST 1 VW Date of Exam: 8/19/2024 7:55 PM EDT Indication: Chest Pain Triage Protocol Comparison: Two-view chest x-ray 5/26/2016 Findings: Lungs are adequately expanded and appear clear. No pneumothorax or large pleural effusion is seen. Heart size is normal. There is stable rightward curvature of the thoracic spine.     Impression: No acute cardiopulmonary abnormality is identified Electronically Signed: Coral Saab  8/19/2024 8:16 PM EDT  Workstation ID: [id number]             Results for orders placed during the hospital encounter of 08/19/24    Adult Transthoracic Echo Limited W/ Cont if Necessary Per Protocol    Interpretation Summary    Left ventricular systolic function is low normal. Left ventricular ejection fraction appears to be 51 - 55%.    The following left ventricular wall segments are hypokinetic: apical anterior, apical septal and  apex hypokinetic.    No significant change compared to previous study from 8/8/2024      Plan for Follow-up of Pending Labs/Results: N/A     Discharge Details        Discharge Medications        Continue These Medications        Instructions Start Date   Allegra Allergy 180 MG tablet  Generic drug: fexofenadine   1 tablet, Oral, Daily      aspirin 81 MG chewable tablet   81 mg, Oral, Daily      atorvastatin 40 MG tablet  Commonly known as: LIPITOR   40 mg, Oral, Daily      buPROPion  MG 12 hr tablet  Commonly known as: WELLBUTRIN SR   150 mg, Oral, Daily      escitalopram 10 MG tablet  Commonly known as: LEXAPRO   10 mg, Oral, Daily      nitroglycerin 0.4 MG SL tablet  Commonly known as: NITROSTAT   0.4 mg, Sublingual, Every 5 Minutes PRN      ticagrelor 90 MG tablet tablet  Commonly known as: BRILINTA   90 mg, Oral, 2 Times Daily               No Known Allergies      Discharge Disposition:  Home or Self Care    Diet:  Hospital:  Diet Order   Procedures    Diet: Cardiac; Healthy Heart (2-3 Na+); Fluid Consistency: Thin (IDDSI 0)       Diet Instructions       Diet: Cardiac Diets; Healthy Heart (2-3 Na+); Regular (IDDSI 7); Thin (IDDSI 0)      Discharge Diet: Cardiac Diets    Cardiac Diet: Healthy Heart (2-3 Na+)    Texture: Regular (IDDSI 7)    Fluid Consistency: Thin (IDDSI 0)             Activity:  No strenuous activity for next 3 months   Okay for light activity and walking     Restrictions or Other Recommendations:  Follow up as below        CODE STATUS:    Code Status and Medical Interventions: CPR (Attempt to Resuscitate); Full Support   Ordered at: 08/19/24 3149     Code Status (Patient has no pulse and is not breathing):    CPR (Attempt to Resuscitate)     Medical Interventions (Patient has pulse or is breathing):    Full Support       Future Appointments   Date Time Provider Department Center   10/17/2024 10:00 AM Demond Rehman MD [facility]       Additional Instructions for the Follow-ups  that You Need to Schedule       Discharge Follow-up with PCP   As directed       Currently Documented PCP:    Provider, No Known    PCP Phone Number:    [phone]     Follow Up Details: Follow up with PCP in 1 week        Discharge Follow-up with Specified Provider: Follow up with Dr. Rehman in 6-8 weeks   As directed      To: Follow up with Dr. Rehman in 6-8 weeks                      XAVIER Andino DO  08/20/24      Time Spent on Discharge:  I spent  40  minutes on this discharge activity which included: face-to-face encounter with the patient, reviewing the data in the system, coordination of the care with the nursing staff as well as consultants, documentation, and entering orders.

## 2024-08-20 NOTE — PLAN OF CARE
Goal Outcome Evaluation:  A&Ox4, BP soft but normal per patient. No new chest pain/discomfort. Normal saline infusing at 75/hr. Appears to be resting comfortably in bed with mother at bedside. NPO- cardiology to see in AM. No significant changes overnight. Assistive devices within reach, safety checks maintained, will continue to monitor.

## 2024-08-20 NOTE — H&P
[facility] Medicine Services  HISTORY AND PHYSICAL    Patient Name: Susan Byers  : 1980  MRN: [id number]  Primary Care Physician: Provider, No Known  Date of admission: 2024    Subjective   Subjective     Chief Complaint:  Chest pain     HPI:  Susan Byers is a 44 y.o. female w/ a hx of transverse sinus thrombosis (), anxiety, depression and recent spontaneous coronary artery dissection (s/p Successful IVUS guided PTCA and drug-eluting stent placement x 1 to the mid LAD; 2024) who presented to the ED w/ c/o chest pain.   Pt was recently admitted to [facility] -8/10. Pt presented w/ c/o chest pain.   Initial ECG showed diffuse J-point elevation.  Initial troponin was 0.29.  Patient's pain persisted and second opponent was 0.6. Pt taken to the cath lab and found to have an occlusion of the mid LAD. Intravascular ultrasound findings were most consistent with SCAD (underlying lesion could not be excluded).  Pt d/c on Brilinta, ASA and statin. BB not started 2/2 low blood pressures. Pt d/c home.   Pt reports that she has been doing well at home since d/c until today. Pt attended Pilates for the first time today since her procedure on . Pt reports that about 1-2 hours post Pilates that she developed left sided chest pain w/ radiation to her left arm. Pain rated 5/10. Pt reports that her pain was similar to the pain she experienced several weeks ago although her symptoms were much more intense then ( symptoms included: nausea, vomiting, limb heaviness, diaphoresis, pain and 10/10 pain). Pt attempted to rest and drink fluids but the pain persisted prompting her ED visit tonight.   Additionally, pt reports having frequent low blood pressure readings since being d/c on 8/10. Pt reports readings of 80/50s and SBP as low as 70's (w/ reported dizziness). Pt reports that her BP historically ran 100/60 prior to her cardiac procedure.   Pt evaluated in the ED.  EKG stable. Troponin 78, 69. CXR unremarkable. Pt admitted to the hospital medicine service for further evaluation.     Review of Systems   Constitutional: Negative.  Negative for chills, fatigue and fever.   HENT: Negative.  Negative for congestion, postnasal drip, rhinorrhea, sinus pain and trouble swallowing.    Eyes: Negative.  Negative for visual disturbance.   Respiratory: Negative.  Negative for cough and shortness of breath.    Cardiovascular:  Positive for chest pain. Negative for palpitations and leg swelling.   Gastrointestinal: Negative.  Negative for abdominal distention, abdominal pain, constipation, diarrhea, nausea and vomiting.   Endocrine: Negative.    Genitourinary: Negative.  Negative for decreased urine volume, difficulty urinating and dysuria.   Musculoskeletal: Negative.  Negative for arthralgias and myalgias.   Skin: Negative.  Negative for wound.   Allergic/Immunologic: Negative.  Negative for immunocompromised state.   Neurological:  Positive for light-headedness. Negative for syncope, weakness and headaches.   Hematological: Negative.  Does not bruise/bleed easily.   Psychiatric/Behavioral: Negative.  Negative for confusion.    All other systems reviewed and are negative.     Personal History     Past Medical History:   Diagnosis Date    History of hepatitis A vaccination     History of hepatitis B vaccination     Hx of esophagogastroduodenoscopy     - normal with the exception of hypotensive LES    Mood disorder     Transverse sinus thrombosis     - etiology ocp's    Varicella infection, resolved      Past Surgical History:   Procedure Laterality Date    CARDIAC CATHETERIZATION  2024    Successful IVUS guided PTCA and drug-eluting stent placement x 1 to the mid LAD for what is most likely SCAD. This was complicated by vessel perforation, likely into a nearby fistula, Which was sealed with the use of a covered stent.     SECTION      10/11 and     RHINOPLASTY       1998    TUBAL ABDOMINAL LIGATION      4/13     Family History:  family history includes Basal cell carcinoma in her mother; Colon polyps in her father; Diabetes in her mother; Diabetes type II in her father; Hyperlipidemia in her mother; Hypertension in her father; Stomach cancer in her maternal grandfather.     Social History:  reports that she has never smoked. She has never used smokeless tobacco. She reports current alcohol use of about 1.0 standard drink of alcohol per week. She reports that she does not use drugs.  Social History     Social History Narrative    Not on file     Medications:  aspirin, atorvastatin, buPROPion SR, escitalopram, fexofenadine, nitroglycerin, and ticagrelor    No Known Allergies    Objective   Objective     Vital Signs:   Temp:  [98.3 °F (36.8 °C)] 98.3 °F (36.8 °C)  Heart Rate:  [70-86] 72  Resp:  [18] 18  BP: ()/(48-73) 91/54    Physical Exam     Constitutional: Awake, alert; non-toxic appearing   Eyes: PERRLA, sclerae anicteric, no conjunctival injection  HENT: NCAT, mucous membranes moist  Neck: Supple, no thyromegaly, no lymphadenopathy, trachea midline  Respiratory: Clear to auscultation bilaterally, nonlabored respirations   Cardiovascular: RRR, no murmurs, rubs, or gallops, no peripheral edema   Gastrointestinal: Positive bowel sounds, soft, nontender, nondistended  Musculoskeletal: Normal ROM bilaterally   Psychiatric: Appropriate affect, cooperative  Neurologic: Oriented x 3, strength symmetric in all extremities, Cranial Nerves grossly intact to confrontation, speech clear  Skin: No rashes, lesions or wounds     Result Review:  I have personally reviewed the results from the time of this admission to 8/20/2024 00:22 EDT and agree with these findings:  [x]  Laboratory list / accordion  []  Microbiology  [x]  Radiology  [x]  EKG/Telemetry   []  Cardiology/Vascular   []  Pathology  [x]  Old records    LAB RESULTS:      Lab 08/19/24 1947   WBC 9.37   HEMOGLOBIN 12.9    HEMATOCRIT 39.9   PLATELETS 417   NEUTROS ABS 5.35   IMMATURE GRANS (ABS) 0.06*   LYMPHS ABS 3.00   MONOS ABS 0.62   EOS ABS 0.27   MCV 90.9   D DIMER QUANT 0.47         Lab 08/19/24 1947   SODIUM 140   POTASSIUM 3.7   CHLORIDE 101   CO2 27.0   ANION GAP 12.0   BUN 11   CREATININE 0.80   EGFR 94.5   GLUCOSE 130*   CALCIUM 9.3         Lab 08/19/24 1947   TOTAL PROTEIN 7.6   ALBUMIN 4.7   GLOBULIN 2.9   ALT (SGPT) 24   AST (SGOT) 25   BILIRUBIN 0.2   ALK PHOS 110   LIPASE 35         Lab 08/19/24  2153 08/19/24 1947   PROBNP  --  639.1*   HSTROP T 69* 78*                 Brief Urine Lab Results       None          Microbiology Results (last 10 days)       ** No results found for the last 240 hours. **          XR Chest 1 View    Result Date: 8/19/2024  XR CHEST 1 VW Date of Exam: 8/19/2024 7:55 PM EDT Indication: Chest Pain Triage Protocol Comparison: Two-view chest x-ray 5/26/2016 Findings: Lungs are adequately expanded and appear clear. No pneumothorax or large pleural effusion is seen. Heart size is normal. There is stable rightward curvature of the thoracic spine.     Impression: Impression: No acute cardiopulmonary abnormality is identified Electronically Signed: Coral Saab  8/19/2024 8:16 PM EDT  Workstation ID: [id number]     Perico's: CTA Chest 8/8/24: 1.No acute abnormalities within the chest. No evidence of acute pulmonary embolism.  2.Nonspecific solid 6 mm nodule within the right middle lobe abutting  the minor fissure and possibly reflecting a perifissural lymph node.  Per current Fleischner guidelines, recommend follow-up CT chest in  6-12 months to evaluate stability.     -ECHO 8/8/24: Normal left ventricular wall thickness.                            The estimated ejection fraction is 50-55%.                            There is mid to distal anterior, apical and inferior apical hypokinesis.                            Normal diastolic filling pattern.                            No significant  valvular abnormalities.                            There is no evidence of pericardial effusion.       Assessment & Plan   Assessment & Plan       Chest pain    Spontaneous dissection of coronary artery    Elevated troponin    Anxiety associated with depression    Susan Byers is a 44 y.o. female w/ a hx of transverse sinus thrombosis (2009), anxiety, depression and recent spontaneous coronary artery dissection (s/p Successful IVUS guided PTCA and drug-eluting stent placement x 1 to the mid LAD; 8/8/2024) who presented to the ED w/ c/o chest pain.     **Chest pain   **Elevated troponin   -recent admission to [facility] where pt underwent a cardiac cath on 8/8  -Cardiac cath 8/8/2024: 1. Successful IVUS guided PTCA and drug-eluting stent placement x 1 to the mid LAD for what is most likely SCAD. This was complicated by vessel perforation, likely into a nearby fistula, Which was sealed with the use of a covered stent.   -ECHO obtained on 8/8; results above   -@ OSH: Troponin 0.671 on 8/8/24 (range 0-0.028)  -pt d/c on statin, ASA, Brilinta (no BB 2/2 low BP)   -developed Chest Pain today after Pilates prompting ED visit   -CXR unremarkable   -troponin 78, 69  -proBNP 639.1  -s/p Lovenox 70mg given in ED  -s/p ASA 324mg given in ED; continue 81mg daily   -continue Lipitor, Brilinta   -NPO pending Cardiology recommendations in am  -s/p 1 liter LR bolus   -giving 500ml NS bolus now (BP 90s/50's)  -NS @ 75ml/hour   -symptom mgt; PRN Morphine for chest pain   -Cardiology consult in am; ED provider spoke w/ Dr. Pak     **Anxiety, Depression   -continue routine Wellbutrin, Lexapro     DVT prophylaxis:  Lovenox     CODE STATUS:    Code Status (Patient has no pulse and is not breathing): CPR (Attempt to Resuscitate)  Medical Interventions (Patient has pulse or is breathing): Full Support    Expected Dischargetbd    This note has been completed as part of a split-shared workflow.     Signature: Electronically  "signed by Coral Bone, SHANICE, 08/20/24, 12:22 AM EDT.    Time spent: 55 minutes       Attending   Admission Attestation       I have performed an independent face-to-face diagnostic evaluation including performing an independent physical examination.  I approve of the documented plan of care above that was reviewed and developed with the advanced practice clinician (APC) and take responsibility for that plan along with its associated risks.  I have updated the HPI as appropriate.    Brief HPI    44F states that she recently suffered spontaneous coronary artery dissection (LAD) 12 days ago, treated at [facility] in [place], patient states she received 2 stents.  She was in the hospital for 2 days and discharged on 8/10.  She states she has felt well since discharge, but earlier today (Monday 8/19) she had onset of chest pain after attending a Pilates class.  She locates the pain in the center of the chest with some radiation in the left arm, no exacerbating or alleviating factors, occasionally sharp.  She states it was similar to the pain she had when she was diagnosed with the LAD dissection as described above.  She states that she hoped that the pain would resolve on its own with rest, but she states it persisted and she decided to come to the ED for further evaluation.  Also of note, she states that ever since her procedure at [facility], she has had persistently low blood pressure readings with systolic pressures as low as the 70-80s at home with occasional dizziness, though she states even prior to the cardiac event described above her systolic pressures were \"usually in the 100s.\"    Attending Physical Exam:  Temp:  [97.7 °F (36.5 °C)-98.3 °F (36.8 °C)] 97.7 °F (36.5 °C)  Heart Rate:  [70-86] 71  Resp:  [16-18] 16  BP: ()/(48-73) 96/56    Constitutional: Awake, alert, NAD, very pleasant.  Eyes: PERRLA, sclerae anicteric, no conjunctival injection  HENT: NCAT, mucous membranes moist  Neck: Supple, no " thyromegaly, no lymphadenopathy, trachea midline  Respiratory: Clear to auscultation bilaterally, nonlabored respirations   Cardiovascular: RRR, no murmurs, rubs, or gallops, palpable pedal pulses bilaterally  Gastrointestinal: Positive bowel sounds, soft, nontender, nondistended  Musculoskeletal: No bilateral ankle edema, no clubbing or cyanosis to extremities  Psychiatric: Appropriate affect, cooperative  Neurologic: Oriented x 3, strength symmetric in all extremities, Cranial Nerves grossly intact to confrontation, speech clear  Skin: No rashes, normal turgor.    Result Review:  I have personally reviewed the results from the time of this admission to 8/20/2024 02:26 EDT and agree with these findings:  [x]  Laboratory list / accordion  []  Microbiology  [x]  Radiology  [x]  EKG/Telemetry   []  Cardiology/Vascular   []  Pathology  []  Old records  []  Other:  Most notable findings include: I reviewed EKG which by my read shows sinus rhythm, ventricular at approximately 75 bpm, Q waves in 3 and aVF, normal axis, overall nonspecific ST/T wave changes.  I reviewed chest x-ray which is a single AP view and by my read shows nothing acute.    Assessment and Plan:    See assessment and plan documented by APC above and updated/edited by me as appropriate.      Total time spent: 43 minutes  Time spent includes time reviewing chart, face-to-face time, counseling patient/family/caregiver, ordering medications/tests/procedures, communicating with other health care professionals, documenting clinical information in the electronic health record, and coordination of care.       Alfredo Stock III, DO  08/20/24

## 2024-10-31 ENCOUNTER — LAB (OUTPATIENT)
Dept: LAB | Facility: HOSPITAL | Age: 44
End: 2024-10-31
Payer: COMMERCIAL

## 2024-10-31 ENCOUNTER — OFFICE VISIT (OUTPATIENT)
Dept: CARDIOLOGY | Facility: CLINIC | Age: 44
End: 2024-10-31
Payer: COMMERCIAL

## 2024-10-31 VITALS
HEART RATE: 70 BPM | DIASTOLIC BLOOD PRESSURE: 64 MMHG | BODY MASS INDEX: 24.64 KG/M2 | HEIGHT: 67 IN | SYSTOLIC BLOOD PRESSURE: 98 MMHG | WEIGHT: 157 LBS | OXYGEN SATURATION: 99 %

## 2024-10-31 DIAGNOSIS — I25.42 SPONTANEOUS DISSECTION OF CORONARY ARTERY: ICD-10-CM

## 2024-10-31 DIAGNOSIS — I25.42 SPONTANEOUS DISSECTION OF CORONARY ARTERY: Primary | ICD-10-CM

## 2024-10-31 LAB
ALBUMIN SERPL-MCNC: 4.4 G/DL (ref 3.5–5.2)
ALBUMIN/GLOB SERPL: 1.5 G/DL
ALP SERPL-CCNC: 94 U/L (ref 39–117)
ALT SERPL W P-5'-P-CCNC: 19 U/L (ref 1–33)
ANION GAP SERPL CALCULATED.3IONS-SCNC: 9.3 MMOL/L (ref 5–15)
AST SERPL-CCNC: 19 U/L (ref 1–32)
BASOPHILS # BLD AUTO: 0.06 10*3/MM3 (ref 0–0.2)
BASOPHILS NFR BLD AUTO: 1.2 % (ref 0–1.5)
BILIRUB SERPL-MCNC: 0.6 MG/DL (ref 0–1.2)
BUN SERPL-MCNC: 14 MG/DL (ref 6–20)
BUN/CREAT SERPL: 16.7 (ref 7–25)
CALCIUM SPEC-SCNC: 9.1 MG/DL (ref 8.6–10.5)
CHLORIDE SERPL-SCNC: 104 MMOL/L (ref 98–107)
CHOLEST SERPL-MCNC: 134 MG/DL (ref 0–200)
CO2 SERPL-SCNC: 26.7 MMOL/L (ref 22–29)
CREAT SERPL-MCNC: 0.84 MG/DL (ref 0.57–1)
DEPRECATED RDW RBC AUTO: 40.6 FL (ref 37–54)
EGFRCR SERPLBLD CKD-EPI 2021: 89.1 ML/MIN/1.73
EOSINOPHIL # BLD AUTO: 0.16 10*3/MM3 (ref 0–0.4)
EOSINOPHIL NFR BLD AUTO: 3.3 % (ref 0.3–6.2)
ERYTHROCYTE [DISTWIDTH] IN BLOOD BY AUTOMATED COUNT: 12.3 % (ref 12.3–15.4)
GLOBULIN UR ELPH-MCNC: 2.9 GM/DL
GLUCOSE SERPL-MCNC: 83 MG/DL (ref 65–99)
HCT VFR BLD AUTO: 39.1 % (ref 34–46.6)
HDLC SERPL-MCNC: 59 MG/DL (ref 40–60)
HGB BLD-MCNC: 12.6 G/DL (ref 12–15.9)
IMM GRANULOCYTES # BLD AUTO: 0.02 10*3/MM3 (ref 0–0.05)
IMM GRANULOCYTES NFR BLD AUTO: 0.4 % (ref 0–0.5)
LDLC SERPL CALC-MCNC: 62 MG/DL (ref 0–100)
LDLC/HDLC SERPL: 1.06 {RATIO}
LYMPHOCYTES # BLD AUTO: 1.64 10*3/MM3 (ref 0.7–3.1)
LYMPHOCYTES NFR BLD AUTO: 34 % (ref 19.6–45.3)
MCH RBC QN AUTO: 29.3 PG (ref 26.6–33)
MCHC RBC AUTO-ENTMCNC: 32.2 G/DL (ref 31.5–35.7)
MCV RBC AUTO: 90.9 FL (ref 79–97)
MONOCYTES # BLD AUTO: 0.49 10*3/MM3 (ref 0.1–0.9)
MONOCYTES NFR BLD AUTO: 10.2 % (ref 5–12)
NEUTROPHILS NFR BLD AUTO: 2.45 10*3/MM3 (ref 1.7–7)
NEUTROPHILS NFR BLD AUTO: 50.9 % (ref 42.7–76)
NRBC BLD AUTO-RTO: 0 /100 WBC (ref 0–0.2)
PLATELET # BLD AUTO: 328 10*3/MM3 (ref 140–450)
PMV BLD AUTO: 10.1 FL (ref 6–12)
POTASSIUM SERPL-SCNC: 4.2 MMOL/L (ref 3.5–5.2)
PROT SERPL-MCNC: 7.3 G/DL (ref 6–8.5)
RBC # BLD AUTO: 4.3 10*6/MM3 (ref 3.77–5.28)
SODIUM SERPL-SCNC: 140 MMOL/L (ref 136–145)
TRIGL SERPL-MCNC: 63 MG/DL (ref 0–150)
VLDLC SERPL-MCNC: 13 MG/DL (ref 5–40)
WBC NRBC COR # BLD AUTO: 4.82 10*3/MM3 (ref 3.4–10.8)

## 2024-10-31 PROCEDURE — 36415 COLL VENOUS BLD VENIPUNCTURE: CPT

## 2024-10-31 PROCEDURE — 80061 LIPID PANEL: CPT

## 2024-10-31 PROCEDURE — 85025 COMPLETE CBC W/AUTO DIFF WBC: CPT

## 2024-10-31 PROCEDURE — 80053 COMPREHEN METABOLIC PANEL: CPT

## 2024-10-31 PROCEDURE — 83695 ASSAY OF LIPOPROTEIN(A): CPT

## 2024-10-31 PROCEDURE — 99214 OFFICE O/P EST MOD 30 MIN: CPT | Performed by: INTERNAL MEDICINE

## 2024-10-31 NOTE — PROGRESS NOTES
OFFICE VISIT  NOTE  [facility] CARDIOLOGY      Name: Susan Byers    Date: 10/31/2024  MRN:  [id number]  :  1980      REFERRING/PRIMARY PROVIDER:  Anastasiia Weeks MD    Chief Complaint   Patient presents with    NSTEMI       HPI: Susan Byers is a 44 y.o. female who presents for spontaneous coronary artery dissection 2024.  NSTEMI at [facility] in [place] cath showed LAD stenosis likely secondary to scad, 2.5 x 48 mm Synergy AZAR placed to the LAD there was extravasation of dye therefore a covered stent was placed which was a 3.0 by 15mm Paparus stent.  She presented to [facility] ER 2024 with similar symptoms while doing Pilates, troponin was minimally elevated, with a flat trend going from 78 down to 63 with no EKG changes.  Limited echo showed EF 50 to 55% with apical and distal anterior septal hypokinesis consistent with previous MI.  Overall she is doing well she does have some anxiety about the whole situation but she is walking for exercise and denies chest pain or significant shortness of breath.  Had some palpitations but that was before she had flu.    Past Medical History:   Diagnosis Date    History of hepatitis A vaccination     History of hepatitis B vaccination     Hx of esophagogastroduodenoscopy     - normal with the exception of hypotensive LES    Mood disorder     Transverse sinus thrombosis     - etiology ocp's    Varicella infection, resolved        Past Surgical History:   Procedure Laterality Date    CARDIAC CATHETERIZATION  2024    Successful IVUS guided PTCA and drug-eluting stent placement x 1 to the mid LAD for what is most likely SCAD. This was complicated by vessel perforation, likely into a nearby fistula, Which was sealed with the use of a covered stent.     SECTION      10/11 and     RHINOPLASTY          TUBAL ABDOMINAL LIGATION             Social History     Socioeconomic History    Marital status:   "  Tobacco Use    Smoking status: Never     Passive exposure: Never    Smokeless tobacco: Never   Vaping Use    Vaping status: Never Used   Substance and Sexual Activity    Alcohol use: Yes     Alcohol/week: 1.0 standard drink of alcohol     Types: 1 Glasses of wine per week    Drug use: Never    Sexual activity: Defer       Family History   Problem Relation Age of Onset    Basal cell carcinoma Mother     Diabetes Mother         borderline    Hyperlipidemia Mother     Colon polyps Father     Diabetes type II Father     Hypertension Father     Stomach cancer Maternal Grandfather         ROS:   Constitutional no fever,  no weight loss   Skin no rash, no subcutaneous nodules   Otolaryngeal no difficulty swallowing   Cardiovascular See HPI   Pulmonary no cough, no sputum production   Gastrointestinal no constipation, no diarrhea   Genitourinary no dysuria, no hematuria   Hematologic no easy bruisability, no abnormal bleeding   Musculoskeletal no muscle pain   Neurologic no dizziness, no falls         No Known Allergies      Current Outpatient Medications:     aspirin 81 MG chewable tablet, Chew 1 tablet Daily., Disp: , Rfl:     atorvastatin (LIPITOR) 40 MG tablet, Take 1 tablet by mouth Daily., Disp: , Rfl:     buPROPion SR (WELLBUTRIN SR) 150 MG 12 hr tablet, Take 1 tablet by mouth Daily., Disp: , Rfl:     escitalopram (LEXAPRO) 10 MG tablet, Take 1 tablet by mouth Daily., Disp: , Rfl:     fexofenadine (ALLEGRA ALLERGY) 180 MG tablet, Take 1 tablet by mouth Daily., Disp: , Rfl:     nitroglycerin (NITROSTAT) 0.4 MG SL tablet, Place 1 tablet under the tongue Every 5 (Five) Minutes As Needed., Disp: , Rfl:     ticagrelor (BRILINTA) 90 MG tablet tablet, Take 1 tablet by mouth 2 (Two) Times a Day., Disp: , Rfl:     Vitals:    10/31/24 0928   BP: 98/64   BP Location: Right arm   Patient Position: Sitting   Cuff Size: Adult   Pulse: 70   SpO2: 99%   Weight: 71.2 kg (157 lb)   Height: 170.2 cm (67\")     Body mass index is 24.59 " "kg/m².    PHYSICAL EXAM:    General Appearance:   well developed  well nourished  HENT:   oropharynx moist  lips not cyanotic  Neck:  thyroid not enlarged  supple  Respiratory:  no respiratory distress  normal breath sounds  no rales  Cardiovascular:  no jugular venous distention  regular rhythm  apical impulse normal  S1 normal, S2 normal  no S3, no S4   no murmur  no rub, no thrill  carotid pulses normal; no bruit  lower extremity edema: none      Musculoskeletal:  no clubbing of fingers.   normocephalic, head atraumatic  Skin:   warm, dry  Psychiatric:  judgement and insight appropriate  normal mood and affect    RESULTS:   Procedures    Results for orders placed during the hospital encounter of 08/19/24    Adult Transthoracic Echo Limited W/ Cont if Necessary Per Protocol    Interpretation Summary    Left ventricular systolic function is low normal. Left ventricular ejection fraction appears to be 51 - 55%.    The following left ventricular wall segments are hypokinetic: apical anterior, apical septal and apex hypokinetic.    No significant change compared to previous study from 8/8/2024        Labs:  Lab Results   Component Value Date    TRIG 78 01/21/2016    HDL 57 01/21/2016    LDL 90 01/21/2016    AST 25 08/19/2024    ALT 24 08/19/2024     No results found for: \"HGBA1C\"  No components found for: \"CREATINININE\"  No results found for: \"EGFRIFNONA\"    Most recent PCP note, imaging tests, and labs reviewed.    ASSESSMENT:  Problem List Items Addressed This Visit       Spontaneous dissection of coronary artery - Primary    Overview     Cardiac cath 8/8/2024: 1. Successful IVUS guided PTCA and drug-eluting stent placement x 1 to the mid  LAD for what is most likely SCAD. This was complicated by vessel perforation, likely into a nearby fistula, Which was sealed with the use of a covered stent.            Relevant Orders    CBC & Differential    Comprehensive Metabolic Panel    Lipid Panel    Lipoprotein A (LPA) "       PLAN:    1.  Scad:  NSTEMI 8/8/2024 requiring stenting of the mid LAD at [facility] complicated by perforation requiring covered stent.  Limited echo 8/20/2024 showed EF 50 to 55% with apical and distal anterior septal hypokinesis consistent with previous MI  Continue aspirin 81 mg daily  Continue Brilinta 90 mg twice daily until 9/1/2025  Avoid all strenuous activity  Low-level aerobic exercise okay.    She should not stop Brilinta early unless urgent surgery necessary.    2.  Hyperlipidemia:  Lipid panel 8/2024 total cholesterol 201   Repeat lipid panel today goal LDL less than 70    3.  Palpitations:  Likely related to influenza, continue monitoring with exercise and hydration.      Return to clinic in 12 months, or sooner as needed.    Thank you for the opportunity to share in the care of your patient; please do not hesitate to call me with any questions.     Demond Rehman MD, FACC, [facility]  Office: [phone] [address] [place] [address]    10/31/24

## 2024-11-01 ENCOUNTER — DOCUMENTATION (OUTPATIENT)
Dept: CARDIOLOGY | Facility: CLINIC | Age: 44
End: 2024-11-01
Payer: COMMERCIAL

## 2024-11-01 LAB — LPA SERPL-SCNC: 10.1 NMOL/L

## 2024-11-08 ENCOUNTER — OFFICE VISIT (OUTPATIENT)
Dept: INTERNAL MEDICINE | Facility: CLINIC | Age: 44
End: 2024-11-08
Payer: COMMERCIAL

## 2024-11-08 VITALS
OXYGEN SATURATION: 98 % | HEART RATE: 73 BPM | DIASTOLIC BLOOD PRESSURE: 60 MMHG | RESPIRATION RATE: 16 BRPM | BODY MASS INDEX: 24.8 KG/M2 | WEIGHT: 158 LBS | SYSTOLIC BLOOD PRESSURE: 84 MMHG | HEIGHT: 67 IN

## 2024-11-08 DIAGNOSIS — Z13.21 SCREENING FOR ENDOCRINE, NUTRITIONAL, METABOLIC AND IMMUNITY DISORDER: ICD-10-CM

## 2024-11-08 DIAGNOSIS — Z13.228 SCREENING FOR ENDOCRINE, NUTRITIONAL, METABOLIC AND IMMUNITY DISORDER: ICD-10-CM

## 2024-11-08 DIAGNOSIS — Z11.59 NEED FOR HEPATITIS C SCREENING TEST: ICD-10-CM

## 2024-11-08 DIAGNOSIS — Z13.29 SCREENING FOR ENDOCRINE, NUTRITIONAL, METABOLIC AND IMMUNITY DISORDER: ICD-10-CM

## 2024-11-08 DIAGNOSIS — F41.8 ANXIETY ASSOCIATED WITH DEPRESSION: ICD-10-CM

## 2024-11-08 DIAGNOSIS — Z76.89 ENCOUNTER TO ESTABLISH CARE WITH NEW DOCTOR: ICD-10-CM

## 2024-11-08 DIAGNOSIS — Z00.00 WELLNESS EXAMINATION: Primary | ICD-10-CM

## 2024-11-08 DIAGNOSIS — Z13.0 SCREENING FOR ENDOCRINE, NUTRITIONAL, METABOLIC AND IMMUNITY DISORDER: ICD-10-CM

## 2024-11-08 DIAGNOSIS — Z13.220 SCREENING, LIPID: ICD-10-CM

## 2024-11-08 DIAGNOSIS — R91.1 LUNG NODULE SEEN ON IMAGING STUDY: ICD-10-CM

## 2024-11-08 PROBLEM — F41.9 ANXIETY: Status: ACTIVE | Noted: 2019-09-09

## 2024-11-08 PROBLEM — J30.2 SEASONAL ALLERGIES: Status: RESOLVED | Noted: 2017-09-05 | Resolved: 2024-11-08

## 2024-11-08 PROBLEM — I21.4 NSTEMI (NON-ST ELEVATED MYOCARDIAL INFARCTION): Status: ACTIVE | Noted: 2024-08-08

## 2024-11-08 PROBLEM — J30.2 SEASONAL ALLERGIES: Status: ACTIVE | Noted: 2017-09-05

## 2024-11-08 PROBLEM — Z87.74 S/P PATENT FORAMEN OVALE CLOSURE: Status: ACTIVE | Noted: 2024-08-09

## 2024-11-08 PROBLEM — G43.909 MIGRAINE: Status: ACTIVE | Noted: 2017-03-14

## 2024-11-08 PROBLEM — F41.9 ANXIETY: Status: RESOLVED | Noted: 2019-09-09 | Resolved: 2024-11-08

## 2024-11-08 PROBLEM — L98.9 PRECANCEROUS SKIN LESION: Status: ACTIVE | Noted: 2024-11-08

## 2024-11-08 RX ORDER — BUPROPION HYDROCHLORIDE 150 MG/1
150 TABLET, EXTENDED RELEASE ORAL DAILY
Qty: 90 TABLET | Refills: 3 | Status: SHIPPED | OUTPATIENT
Start: 2024-11-08

## 2024-11-08 RX ORDER — ESCITALOPRAM OXALATE 10 MG/1
10 TABLET ORAL DAILY
Qty: 90 TABLET | Refills: 3 | Status: SHIPPED | OUTPATIENT
Start: 2024-11-08

## 2024-11-08 NOTE — PATIENT INSTRUCTIONS
Diagnosis Discussed   Continue to monitor   Plenty of fluids, monitor diet and exercise   Labs ordered will notify of results   Immunizations up to date   Follow up with Cardiology   Follow up with GYN - PAP   Follow up with Dermatology- skin checks   Take medications as instructed- refills   CT scan Chest ordered for further evaluation of lung nodule  Follow up as directed   If symptoms worsen or persist please seek further evaluation

## 2024-11-08 NOTE — PROGRESS NOTES
Office Note     Name: Suasn Byers    : 1980     MRN: [id number]     Chief Complaint  Annual Exam and Establish Care    Subjective     History of Present Illness:  Susan Byers is a 44 y.o. female who presents today for physical, establish care  Will return for fasting labs   Is doing well. No issues or concerns     Follows with Dr. Rehman- cardiology   Recent NSTEMI- 2024    Found to have right lung nodule 6mm incidental finding- recommended repeat CT in 6-12 months  Will order re imaging today     PMH-  Seasonal allergies   Spontaneous dissection of coronary artery   S/p patent foramen ovale closure   NSTEMI  GERD   Anxiety   Depression   Migraines   Lung nodule - follow up CT chest at next visit   Precancerous lesions     Meds-  Aspirin 81mg daily   Atorvastatin 40mg daily   Wellbutrin SR 150mg daily   Lexapro 10mg daily   Allegra 180mg daily   Nitroglycerin as needed   Brilinta 90mg twice a day x 1 year- 2025    Family Hx-   Maternal GM- - alzheimer   Maternal GF- - pancreatic cancer   Paternal GM- - unsure  Paternal GF- - unsure   Mother- alive- basal cell carcinoma, HPL  Father- alive- DM, HTN, colon polyps   Brother- alive- anxiety, heart murmur     Alcohol 1 per week   Smoking none   Drug use -none   Job travel nurse- NICU   Stress - 7/10   Sun Exposure - protects skin- follows with Dermatology     Dental/Eye exams - up to date. Vision is stable   Diet/Exercise- Exercise limited- walking   Diet- Healthy- moderate. Low calorie     OBGYN - Dr. Mchugh   PAP - scheduled   Mammo - completed   DEXA - not yet   BC/Hormone replacement - none   Vitamin D - supplement daily      Colonoscopy/Cologuard - - stable. Will resume at 47 years of age     Immunizations  Tdap- up to date   Flu- completed   COVID- completed no recent booster     I spent 30 minutes on the separately reported service. This time is not included in the time used to support the E/M service  also reported today.  This time includes activities preparing for the visit, reviewing test, reviewing history and performing an appropriate examination. Discussing with the patient and reviewing and independently interpreting the diagnostic studies, communicating that information to the patient along with discussing care coordination and referrals if needed and documenting information in the medical records.     Review of Systems:   Review of Systems   Constitutional:  Negative for chills and fever.   HENT:  Negative for dental problem, trouble swallowing and voice change.    Eyes:  Negative for visual disturbance.   Respiratory:  Negative for cough, chest tightness, shortness of breath and wheezing.    Cardiovascular:  Negative for chest pain, palpitations and leg swelling.   Gastrointestinal:  Negative for abdominal pain, blood in stool, constipation, diarrhea, nausea and vomiting.   Genitourinary:  Negative for dysuria.   Musculoskeletal:  Negative for gait problem.   Skin:  Negative for rash.   Neurological:  Negative for light-headedness and headache.   Psychiatric/Behavioral:  Positive for dysphoric mood, depressed mood and stress. Negative for self-injury, sleep disturbance and suicidal ideas. The patient is nervous/anxious.        Past Medical History:   Past Medical History:   Diagnosis Date    History of hepatitis A vaccination     History of hepatitis B vaccination     Hx of esophagogastroduodenoscopy     2/16- normal with the exception of hypotensive LES    Mood disorder     Transverse sinus thrombosis     2009- etiology ocp's    Varicella infection, resolved        Past Surgical History:   Past Surgical History:   Procedure Laterality Date    CARDIAC CATHETERIZATION  08/08/2024    Successful IVUS guided PTCA and drug-eluting stent placement x 1 to the mid LAD for what is most likely SCAD. This was complicated by vessel perforation, likely into a nearby fistula, Which was sealed with the use of a  covered stent.     SECTION      10/11 and     RHINOPLASTY          TUBAL ABDOMINAL LIGATION             Immunizations:   Immunization History   Administered Date(s) Administered    COVID-19 (PFIZER) BIVALENT 12+YRS 2022    COVID-19 (PFIZER) Purple Cap Monovalent 2021, 2021, 2021    Fluzone  >6mos 10/29/2024    Fluzone (or Fluarix & Flulaval for VFC) >6mos 2020    Hep A, Unspecified 2018    Hepatitis A 2018, 2019    Hepatitis B Adult/Adolescent IM 1997, 1999, 2005    IPV 1987    Influenza Injectable Mdck Pf Quad 2023    Influenza, Unspecified 2012, 10/21/2013, 10/12/2014, 10/21/2016, 10/23/2017, 2018, 2019    MMR 1983, 1993    Tdap 2009, 2009, 2018        Medications:     Current Outpatient Medications:     aspirin 81 MG chewable tablet, Chew 1 tablet Daily., Disp: , Rfl:     atorvastatin (LIPITOR) 40 MG tablet, Take 1 tablet by mouth Daily., Disp: , Rfl:     buPROPion SR (WELLBUTRIN SR) 150 MG 12 hr tablet, Take 1 tablet by mouth Daily., Disp: 90 tablet, Rfl: 3    escitalopram (LEXAPRO) 10 MG tablet, Take 1 tablet by mouth Daily., Disp: 90 tablet, Rfl: 3    fexofenadine (ALLEGRA ALLERGY) 180 MG tablet, Take 1 tablet by mouth Daily., Disp: , Rfl:     nitroglycerin (NITROSTAT) 0.4 MG SL tablet, Place 1 tablet under the tongue Every 5 (Five) Minutes As Needed., Disp: , Rfl:     ticagrelor (BRILINTA) 90 MG tablet tablet, Take 1 tablet by mouth 2 (Two) Times a Day., Disp: , Rfl:     Allergies:   No Known Allergies    Family History:   Family History   Problem Relation Age of Onset    Basal cell carcinoma Mother     Hyperlipidemia Mother     Colon polyps Father     Diabetes type II Father     Hypertension Father     Anxiety disorder Brother     Heart murmur Brother     Alzheimer's disease Maternal Grandmother     Pancreatic cancer Maternal Grandfather        Social History:  "  Social History     Socioeconomic History    Marital status:    Tobacco Use    Smoking status: Never     Passive exposure: Never    Smokeless tobacco: Never   Vaping Use    Vaping status: Never Used   Substance and Sexual Activity    Alcohol use: Yes     Alcohol/week: 1.0 standard drink of alcohol     Types: 1 Glasses of wine per week    Drug use: Never    Sexual activity: Defer         Objective     Vital Signs  BP (!) 84/60   Pulse 73   Resp 16   Ht 170.2 cm (67\")   Wt 71.7 kg (158 lb)   SpO2 98%   BMI 24.75 kg/m²   Estimated body mass index is 24.75 kg/m² as calculated from the following:    Height as of this encounter: 170.2 cm (67\").    Weight as of this encounter: 71.7 kg (158 lb).    BMI is within normal parameters. No other follow-up for BMI required.      Physical Exam  Vitals and nursing note reviewed.   Constitutional:       General: She is not in acute distress.     Appearance: Normal appearance.   HENT:      Head: Normocephalic and atraumatic.      Right Ear: Tympanic membrane normal.      Left Ear: Tympanic membrane normal.      Nose: Nose normal.      Mouth/Throat:      Mouth: Mucous membranes are moist.   Eyes:      Extraocular Movements: Extraocular movements intact.      Conjunctiva/sclera: Conjunctivae normal.      Pupils: Pupils are equal, round, and reactive to light.   Cardiovascular:      Rate and Rhythm: Normal rate and regular rhythm.      Heart sounds: Normal heart sounds.   Pulmonary:      Effort: Pulmonary effort is normal. No respiratory distress.      Breath sounds: Normal breath sounds.   Abdominal:      General: Bowel sounds are normal.      Palpations: Abdomen is soft.   Musculoskeletal:         General: Normal range of motion.      Cervical back: Normal range of motion.      Comments: Moving all extremities    Skin:     General: Skin is warm and dry.   Neurological:      General: No focal deficit present.      Mental Status: She is alert and oriented to person, place, " and time.   Psychiatric:         Mood and Affect: Mood normal.         Behavior: Behavior normal.         Thought Content: Thought content normal.         Judgment: Judgment normal.          Procedures     Assessment and Plan   Diagnosis Discussed   Continue to monitor   Plenty of fluids, monitor diet and exercise   Labs ordered will notify of results   Immunizations up to date   Follow up with Cardiology   Follow up with GYN - PAP   Follow up with Dermatology- skin checks   Take medications as instructed- refills   CT scan Chest ordered for further evaluation of lung nodule  Follow up as directed   If symptoms worsen or persist please seek further evaluation     1. Wellness examination    2. Encounter to establish care with new doctor  Reviewed PMH, medications and specialists     3. Lung nodule seen on imaging study  - CT Chest With Contrast; Future  Repeat study- will monitor and notify of results     4. Anxiety associated with depression  - buPROPion SR (WELLBUTRIN SR) 150 MG 12 hr tablet; Take 1 tablet by mouth Daily.  Dispense: 90 tablet; Refill: 3  - escitalopram (LEXAPRO) 10 MG tablet; Take 1 tablet by mouth Daily.  Dispense: 90 tablet; Refill: 3  Stable on current medications     5. Need for hepatitis C screening test  - Hepatitis C Antibody; Future    6. Screening for endocrine, nutritional, metabolic and immunity disorder  - CBC (No Diff); Future  - Comprehensive Metabolic Panel; Future  - Hemoglobin A1c; Future  - TSH Rfx On Abnormal To Free T4; Future    7. Screening, lipid  - Lipid Panel; Future       Follow Up  Return in about 1 year (around 11/8/2025), or if symptoms worsen or fail to improve, for Annual, fasting labs.    Tangela Hoffman MD  [facility]  [address] [place] [id number] [phone]

## 2024-11-15 ENCOUNTER — PATIENT ROUNDING (BHMG ONLY) (OUTPATIENT)
Dept: INTERNAL MEDICINE | Facility: CLINIC | Age: 44
End: 2024-11-15
Payer: COMMERCIAL

## 2024-11-15 NOTE — PROGRESS NOTES
A My-chart message has been sent to the patient for Patient Rounding with [facility].

## 2024-12-11 ENCOUNTER — LAB (OUTPATIENT)
Dept: LAB | Facility: HOSPITAL | Age: 44
End: 2024-12-11
Payer: COMMERCIAL

## 2024-12-11 ENCOUNTER — HOSPITAL ENCOUNTER (OUTPATIENT)
Dept: CT IMAGING | Facility: HOSPITAL | Age: 44
Discharge: HOME | End: 2024-12-11
Payer: COMMERCIAL

## 2024-12-11 DIAGNOSIS — R91.1 LUNG NODULE SEEN ON IMAGING STUDY: ICD-10-CM

## 2024-12-11 DIAGNOSIS — Z11.59 NEED FOR HEPATITIS C SCREENING TEST: ICD-10-CM

## 2024-12-11 DIAGNOSIS — Z13.21 SCREENING FOR ENDOCRINE, NUTRITIONAL, METABOLIC AND IMMUNITY DISORDER: ICD-10-CM

## 2024-12-11 DIAGNOSIS — Z13.29 SCREENING FOR ENDOCRINE, NUTRITIONAL, METABOLIC AND IMMUNITY DISORDER: ICD-10-CM

## 2024-12-11 DIAGNOSIS — Z13.228 SCREENING FOR ENDOCRINE, NUTRITIONAL, METABOLIC AND IMMUNITY DISORDER: ICD-10-CM

## 2024-12-11 DIAGNOSIS — Z13.0 SCREENING FOR ENDOCRINE, NUTRITIONAL, METABOLIC AND IMMUNITY DISORDER: ICD-10-CM

## 2024-12-11 DIAGNOSIS — Z13.220 SCREENING, LIPID: ICD-10-CM

## 2024-12-11 LAB
DEPRECATED RDW RBC AUTO: 40.6 FL (ref 37–54)
ERYTHROCYTE [DISTWIDTH] IN BLOOD BY AUTOMATED COUNT: 12.6 % (ref 12.3–15.4)
HBA1C MFR BLD: 5 % (ref 4.8–5.6)
HCT VFR BLD AUTO: 38.5 % (ref 34–46.6)
HCV AB SER QL: NORMAL
HGB BLD-MCNC: 13.1 G/DL (ref 12–15.9)
MCH RBC QN AUTO: 30.5 PG (ref 26.6–33)
MCHC RBC AUTO-ENTMCNC: 34 G/DL (ref 31.5–35.7)
MCV RBC AUTO: 89.7 FL (ref 79–97)
PLATELET # BLD AUTO: 305 10*3/MM3 (ref 140–450)
PMV BLD AUTO: 10.5 FL (ref 6–12)
RBC # BLD AUTO: 4.29 10*6/MM3 (ref 3.77–5.28)
WBC NRBC COR # BLD AUTO: 6.03 10*3/MM3 (ref 3.4–10.8)

## 2024-12-11 PROCEDURE — 25510000001 IOPAMIDOL 61 % SOLUTION: Performed by: FAMILY MEDICINE

## 2024-12-11 PROCEDURE — 80061 LIPID PANEL: CPT

## 2024-12-11 PROCEDURE — 86803 HEPATITIS C AB TEST: CPT

## 2024-12-11 PROCEDURE — 83036 HEMOGLOBIN GLYCOSYLATED A1C: CPT

## 2024-12-11 PROCEDURE — 80050 GENERAL HEALTH PANEL: CPT

## 2024-12-11 PROCEDURE — 71260 CT THORAX DX C+: CPT

## 2024-12-11 RX ORDER — IOPAMIDOL 612 MG/ML
85 INJECTION, SOLUTION INTRAVASCULAR
Status: COMPLETED | OUTPATIENT
Start: 2024-12-11 | End: 2024-12-11

## 2024-12-11 RX ADMIN — IOPAMIDOL 85 ML: 612 INJECTION, SOLUTION INTRAVENOUS at 09:39

## 2024-12-12 LAB
ALBUMIN SERPL-MCNC: 4.1 G/DL (ref 3.5–5.2)
ALBUMIN/GLOB SERPL: 1.4 G/DL
ALP SERPL-CCNC: 61 U/L (ref 39–117)
ALT SERPL W P-5'-P-CCNC: 11 U/L (ref 1–33)
ANION GAP SERPL CALCULATED.3IONS-SCNC: 10.2 MMOL/L (ref 5–15)
AST SERPL-CCNC: 18 U/L (ref 1–32)
BILIRUB SERPL-MCNC: 0.5 MG/DL (ref 0–1.2)
BUN SERPL-MCNC: 16 MG/DL (ref 6–20)
BUN/CREAT SERPL: 18.4 (ref 7–25)
CALCIUM SPEC-SCNC: 9 MG/DL (ref 8.6–10.5)
CHLORIDE SERPL-SCNC: 105 MMOL/L (ref 98–107)
CHOLEST SERPL-MCNC: 119 MG/DL (ref 0–200)
CO2 SERPL-SCNC: 22.8 MMOL/L (ref 22–29)
CREAT SERPL-MCNC: 0.87 MG/DL (ref 0.57–1)
EGFRCR SERPLBLD CKD-EPI 2021: 85.4 ML/MIN/1.73
GLOBULIN UR ELPH-MCNC: 3 GM/DL
GLUCOSE SERPL-MCNC: 75 MG/DL (ref 65–99)
HDLC SERPL-MCNC: 55 MG/DL (ref 40–60)
LDLC SERPL CALC-MCNC: 50 MG/DL (ref 0–100)
LDLC/HDLC SERPL: 0.93 {RATIO}
POTASSIUM SERPL-SCNC: 4.3 MMOL/L (ref 3.5–5.2)
PROT SERPL-MCNC: 7.1 G/DL (ref 6–8.5)
SODIUM SERPL-SCNC: 138 MMOL/L (ref 136–145)
TRIGL SERPL-MCNC: 64 MG/DL (ref 0–150)
TSH SERPL DL<=0.05 MIU/L-ACNC: 0.39 UIU/ML (ref 0.27–4.2)
VLDLC SERPL-MCNC: 14 MG/DL (ref 5–40)

## 2024-12-13 ENCOUNTER — TELEPHONE (OUTPATIENT)
Dept: INTERNAL MEDICINE | Facility: CLINIC | Age: 44
End: 2024-12-13
Payer: COMMERCIAL

## 2024-12-13 DIAGNOSIS — R91.1 LUNG NODULE SEEN ON IMAGING STUDY: Primary | ICD-10-CM

## 2024-12-13 DIAGNOSIS — R91.1 NODULE OF MIDDLE LOBE OF RIGHT LUNG: ICD-10-CM

## 2024-12-13 NOTE — TELEPHONE ENCOUNTER
Let pt know of results. Pt verbalized understanding.        ----- Message from Tangela Hoffman sent at 12/13/2024 10:30 AM EST -----  CT chest   IMPRESSION:  6 mm right middle lobe nodule, stable by report. The appearance is consistent with a perifissural lymph node    Will continue to monitor   Will place referral to lung nodule clinic for continued maintenance and further recommendations

## 2025-01-24 DIAGNOSIS — R35.0 URINARY FREQUENCY: Primary | ICD-10-CM

## 2025-01-24 DIAGNOSIS — R39.9 UTI SYMPTOMS: ICD-10-CM

## 2025-01-24 RX ORDER — NITROFURANTOIN 25; 75 MG/1; MG/1
100 CAPSULE ORAL 2 TIMES DAILY
Qty: 14 CAPSULE | Refills: 0 | Status: SHIPPED | OUTPATIENT
Start: 2025-01-24 | End: 2025-01-31

## 2025-01-24 NOTE — PROGRESS NOTES
Will send in round of Macrobid 100mg twice a day x7 days for patient. Patient to come in for urine studies. Will notify of results when available and if antibiotic needs changed.     Patient states symptoms started yesterday and home UTI testing positive. Frequency and urgency with burning.   Denies fevers, chills or back pain.

## 2025-02-03 ENCOUNTER — OFFICE VISIT (OUTPATIENT)
Dept: PULMONOLOGY | Facility: CLINIC | Age: 45
End: 2025-02-03
Payer: COMMERCIAL

## 2025-02-03 VITALS
OXYGEN SATURATION: 98 % | WEIGHT: 160 LBS | HEIGHT: 67 IN | SYSTOLIC BLOOD PRESSURE: 100 MMHG | DIASTOLIC BLOOD PRESSURE: 70 MMHG | HEART RATE: 82 BPM | TEMPERATURE: 98.2 F | BODY MASS INDEX: 25.11 KG/M2

## 2025-02-03 DIAGNOSIS — R91.1 LUNG NODULE SEEN ON IMAGING STUDY: Primary | ICD-10-CM

## 2025-02-03 PROCEDURE — 99203 OFFICE O/P NEW LOW 30 MIN: CPT | Performed by: INTERNAL MEDICINE

## 2025-02-03 PROCEDURE — 94729 DIFFUSING CAPACITY: CPT | Performed by: INTERNAL MEDICINE

## 2025-02-03 PROCEDURE — 94375 RESPIRATORY FLOW VOLUME LOOP: CPT | Performed by: INTERNAL MEDICINE

## 2025-02-03 PROCEDURE — 94726 PLETHYSMOGRAPHY LUNG VOLUMES: CPT | Performed by: INTERNAL MEDICINE

## 2025-02-03 RX ORDER — ATORVASTATIN CALCIUM 40 MG/1
1 TABLET, FILM COATED ORAL DAILY
COMMUNITY
Start: 2024-12-03

## 2025-02-03 NOTE — PROGRESS NOTES
Pulmonary Consult      Patient Care Team:  Tangela Hoffman MD as PCP - General (Internal Medicine)  Provider, No Known as PCP - Family Medicine  Provider, No Known  Demond Rehman MD as Consulting Physician (Cardiology)  Judith Mchugh MD as Consulting Physician (Obstetrics and Gynecology)  Ana Bobo MD as Consulting Physician (Dermatology)    Chief Complaint / Reason: RML lung nodule      Chief Complaint   Patient presents with    Lung Nodule     Post ct        Subjective     44 y.o. lifetime nonsmoker with history of PFO closure in 2018, recent MI secondary to spontaneous LAD dissection with covered stent placement, found incidentally to have a RML lung nodule for which she is here for further evaluation.  Patient works as a travel nurse.    No weight loss, night sweats, hemoptysis, or other respiratory symptoms.      History taken from: patient    PMH/FH/Social History were reviewed and updated appropriately in the electronic medical record.     Past Medical History:   Diagnosis Date    History of hepatitis A vaccination     History of hepatitis B vaccination     Hx of esophagogastroduodenoscopy     - normal with the exception of hypotensive LES    Mood disorder     Transverse sinus thrombosis     - etiology ocp's    Varicella infection, resolved      Past Surgical History:   Procedure Laterality Date    CARDIAC CATHETERIZATION  2024    Successful IVUS guided PTCA and drug-eluting stent placement x 1 to the mid LAD for what is most likely SCAD. This was complicated by vessel perforation, likely into a nearby fistula, Which was sealed with the use of a covered stent.     SECTION      10/11 and     RHINOPLASTY          TUBAL ABDOMINAL LIGATION           Family History   Problem Relation Age of Onset    Basal cell carcinoma Mother     Hyperlipidemia Mother     Colon polyps Father     Diabetes type II Father     Hypertension Father     Anxiety disorder Brother      Heart murmur Brother     Alzheimer's disease Maternal Grandmother     Pancreatic cancer Maternal Grandfather      Social History     Socioeconomic History    Marital status:    Tobacco Use    Smoking status: Never     Passive exposure: Never    Smokeless tobacco: Never   Vaping Use    Vaping status: Never Used   Substance and Sexual Activity    Alcohol use: Yes     Alcohol/week: 1.0 standard drink of alcohol     Types: 1 Glasses of wine per week    Drug use: Never    Sexual activity: Defer         Review of Systems:   Review of Systems  All other systems were reviewed and are negative.  Exceptions are noted in the subjective or above.      Objective     Vital Signs  Temp:  [98.2 °F (36.8 °C)] 98.2 °F (36.8 °C)  Heart Rate:  [82] 82  BP: (100)/(70) 100/70  No intake/output data recorded.  Body mass index is 25.05 kg/m².    Physical Exam:     Constitutional:    Alert, cooperative, in no acute distress   Head:    Normocephalic, without obvious abnormality, atraumatic   Eyes:            Lids and lashes normal, conjunctivae and sclerae normal, no   icterus, no pallor, corneas clear, PER   ENMT:   Ears appear intact with no abnormalities noted      No oral lesions, no thrush, oral mucosa moist   Neck:   No adenopathy, supple, trachea midline, no thyromegaly, no JVD       Lungs/Resp:     Normal effort, symmetric chest rise, no crepitus, clear to      auscultation bilaterally, no chest wall tenderness                 Heart/CV:    Regular rhythm and normal rate, normal S1 and S2, no            murmur   Abdomen/GI:     Soft, non-tender, non-distended, normal bowel sounds   :     Deferred   Extremities/MSK:   No clubbing or cyanosis.  No edema.  Normal tone.  No deformities.    Pulses:   Pulses palpable and equal bilaterally   Skin:   No bleeding, bruising or rash   Heme/Lymph:   No cervical or supraclavicular adenopathy.    Neurologic:    Psychiatric:       Moves all extremities with no obvious focal motor deficit.   Cranial nerves 2 - 12 grossly intact   Oriented x 3, normal affect.          The above findings are documentation of my personal physical examination from today.  Electronically signed by:  Aramis Beltran MD  02/03/25  09:09 EST     Results Review:     I reviewed the patient's new clinical results.       Imaging:  I reviewed the patient's new imaging including reviewing the images and radiologist's report.      CT chest from 12/11/2024 was reviewed which shows a 6 mm noncalcified nodule in the fissure between the right middle and right upper lobes.    PFTs:     Full pulmonary function testing was done on 2/3/2025.  Please see scanned PFT report for complete details.  FVC was 3.93 L or 97% predicted.  FEV1 was 3.13 L or 96% predicted.  FEV1 to FVC ratio was 80%.  Maximal voluntary ventilation 76 L/min or 69% predicted.  Total lung capacity 5.50 L or 95% predicted.  Residual volume 1.58 L or 106% predicted.  DLCO was 114% predicted.    Interpretation: No obstruction.  Low maximal voluntary ventilation which is likely effort related.  No restriction.  No air trapping or hyperinflation.  Normal DLCO.    Medication Review:     Current Outpatient Medications   Medication Sig Dispense Refill    aspirin 81 MG EC tablet Take 1 tablet by mouth Daily.      atorvastatin (LIPITOR) 40 MG tablet Take 1 tablet by mouth Daily.      buPROPion SR (WELLBUTRIN SR) 150 MG 12 hr tablet Take 1 tablet by mouth Daily. 90 tablet 3    escitalopram (LEXAPRO) 10 MG tablet Take 1 tablet by mouth Daily. 90 tablet 3    fexofenadine (ALLEGRA ALLERGY) 180 MG tablet Take 1 tablet by mouth Daily.      nitroglycerin (NITROSTAT) 0.4 MG SL tablet Place 1 tablet under the tongue Every 5 (Five) Minutes As Needed.      ticagrelor (BRILINTA) 90 MG tablet tablet Take 1 tablet by mouth 2 (Two) Times a Day.       No current facility-administered medications for this visit.       Assessment & Plan   Problems Addressed this Visit          Pulmonary and  Pneumonias    Lung nodule seen on imaging study - Primary    Relevant Orders    Spirometry with Diffusion Capacity & Lung Volumes (Completed)     Diagnoses         Codes Comments    Lung nodule seen on imaging study    -  Primary ICD-10-CM: R91.1  ICD-9-CM: 793.11           44 y.o. lifetime nonsmoker with history of PFO closure in 2018, recent MI secondary to spontaneous LAD dissection with covered stent placement, found incidentally to have a RML lung nodule for which she is here for further evaluation.  Patient works as a travel nurse.    No weight loss, night sweats, hemoptysis, or other respiratory symptoms.     I reviewed the patient's CT scan of the chest from December 11, 2024.  It shows a 6 mm right middle lobe intrafissural nodule.    Plan:  1.  For right middle lobe incidental lung nodule: Low risk patient.  Repeat CT scan 1 year and, if stable, she will not need further follow-up.  I will see her back after CT scan.  2.  For history of spontaneous coronary artery dissection: Status post stent.  Patient is on aspirin and Brilinta.  3.  For history of PFO: Status post closure.    Immunization History   Administered Date(s) Administered    COVID-19 (PFIZER) BIVALENT 12+YRS 09/20/2022    COVID-19 (PFIZER) Purple Cap Monovalent 01/12/2021, 02/01/2021, 09/09/2021    Fluzone  >6mos 10/29/2024    Fluzone (or Fluarix & Flulaval for VFC) >6mos 09/22/2020    Hep A, Unspecified 11/08/2018    Hepatitis A 11/01/2018, 05/16/2019    Hepatitis B Adult/Adolescent IM 07/30/1997, 07/21/1999, 09/21/2005    IPV 04/20/1987    Influenza Injectable Mdck Pf Quad 09/07/2023    Influenza, Unspecified 11/30/2012, 10/21/2013, 10/12/2014, 10/21/2016, 10/23/2017, 09/25/2018, 09/25/2019    MMR 11/21/1983, 06/22/1993    Tdap 01/01/2009, 02/04/2009, 11/14/2018       Social History     Tobacco Use   Smoking Status Never    Passive exposure: Never   Smokeless Tobacco Never        Susan Byers  reports that she has never smoked. She has  never been exposed to tobacco smoke. She has never used smokeless tobacco.          Advance Care Planning   N/A          I personally spent a total of 30 minutes on patient visit today including chart review, face to face with the patient obtaining the history and physical exam, review of pertinent images and tests, counseling and discussion and/or coordination of care as described above, and documentation.  Total time excludes time spent on other separate services such as performing procedures or test interpretation, if applicable.      Electronically signed by:  Aramis Beltran MD  02/03/25  09:09 EST      *. Please note that portions of this note were completed with [employer] - a voice recognition program.

## 2025-03-03 ENCOUNTER — TELEPHONE (OUTPATIENT)
Dept: CARDIOLOGY | Facility: CLINIC | Age: 45
End: 2025-03-03

## 2025-03-03 NOTE — TELEPHONE ENCOUNTER
Caller: Susan Byers    Relationship: Self    Best call back number: [phone]    What form or medical record are you requesting: CLEARANCE     Who is requesting this form or medical record from you: DR. CHEATHAM     How would you like to receive the form or medical records (pick-up, mail, fax): FAX  If fax, what is the fax number: [phone]    Timeframe paperwork needed: ASAP    Additional notes:

## 2025-03-19 ENCOUNTER — OFFICE VISIT (OUTPATIENT)
Dept: INTERNAL MEDICINE | Facility: CLINIC | Age: 45
End: 2025-03-19
Payer: COMMERCIAL

## 2025-03-19 VITALS
DIASTOLIC BLOOD PRESSURE: 62 MMHG | WEIGHT: 157 LBS | TEMPERATURE: 97.1 F | HEIGHT: 67 IN | OXYGEN SATURATION: 99 % | RESPIRATION RATE: 16 BRPM | BODY MASS INDEX: 24.64 KG/M2 | HEART RATE: 79 BPM | SYSTOLIC BLOOD PRESSURE: 94 MMHG

## 2025-03-19 DIAGNOSIS — Z01.818 PRE-OP EXAMINATION: Primary | ICD-10-CM

## 2025-03-19 LAB — Lab: NORMAL

## 2025-03-19 PROCEDURE — 99214 OFFICE O/P EST MOD 30 MIN: CPT | Performed by: FAMILY MEDICINE

## 2025-03-19 PROCEDURE — 93000 ELECTROCARDIOGRAM COMPLETE: CPT | Performed by: FAMILY MEDICINE

## 2025-03-19 RX ORDER — TRAZODONE HYDROCHLORIDE 50 MG/1
25 TABLET ORAL
COMMUNITY
Start: 2025-02-26 | End: 2025-04-27

## 2025-03-19 NOTE — PATIENT INSTRUCTIONS
Diagnosis Discussed   Continue to monitor   Plenty of fluids, monitor diet and exercise   EKG- today   Cleared from Medicine standpoint for surgery   Will need clearance from Cardiology and anticoagulation guidance - Dr. Rehman   If symptoms worsen or persist please seek further evaluation

## 2025-03-19 NOTE — PROGRESS NOTES
[facility]   PREOPERATIVE HISTORY AND PHYSICAL    Patient Name:Susan Byers  : 1980  MRN: [id number]  Primary Care Physician: Tangela Hoffman MD  Date of admission:     Subjective   Subjective     Chief Complaint: preoperative evaluation      Pertinent negative symptoms include no abdominal pain, no chest pain, no chills, no congestion, no cough, no diaphoresis, no fatigue, no fever, no headaches, no myalgias, no nausea, no neck pain, no numbness, no rash, no sore throat, no dysuria, no vomiting and no weakness.       uSsan Byers is a 44 y.o. female who presents for preoperative evaluation. She is scheduled for breast augmentation and tummy tuck by Dr. Moreira with Plastic Surgeon on 2025.     PMH - NSTEMI, GERD, anxiety/depression, migraines, spontaneous dissection of coronary artery     NKA- NKDA     Steroids - none in the last 6 months   Anesthesia complications - none   Family hx of anesthesia complications - none     EKG today  Brilinta will be completed by date of surgery   Will remain on low dose aspirin   Will touch base with Dr. Rehman for official cardiac clearance.     Doing well overall. No issues or concerns   Denies chest pain, sob, nausea, vomiting, abdominal pain, diarrhea, constipation       Review of Systems   Constitutional:  Negative for chills, diaphoresis, fatigue and fever.   HENT:  Negative for congestion, dental problem, sore throat, trouble swallowing and voice change.    Eyes:  Negative for visual disturbance.   Respiratory:  Negative for cough, chest tightness and shortness of breath.    Cardiovascular:  Negative for chest pain, palpitations and leg swelling.   Gastrointestinal:  Negative for abdominal pain, blood in stool, constipation, diarrhea, nausea and vomiting.   Genitourinary:  Negative for dysuria.   Musculoskeletal:  Negative for back pain, myalgias and neck pain.   Skin:  Negative for rash.   Neurological:  Negative for weakness, numbness and headaches.    Hematological:  Bruises/bleeds easily.        Currently on Brillinta and low dose aspirin    Psychiatric/Behavioral:  Negative for dysphoric mood, self-injury, sleep disturbance and suicidal ideas.         Personal History     Past Medical History:   Diagnosis Date    Anxiety     History of hepatitis A vaccination     History of hepatitis B vaccination     Hx of esophagogastroduodenoscopy     - normal with the exception of hypotensive LES    Mood disorder     Myocardial infarction     Transverse sinus thrombosis     - etiology ocp's    Varicella infection, resolved        Past Surgical History:   Procedure Laterality Date    CARDIAC CATHETERIZATION  2024    Successful IVUS guided PTCA and drug-eluting stent placement x 1 to the mid LAD for what is most likely SCAD. This was complicated by vessel perforation, likely into a nearby fistula, Which was sealed with the use of a covered stent.     SECTION      10/11 and     RHINOPLASTY          TUBAL ABDOMINAL LIGATION             Family History: Her family history includes Alzheimer's disease in her maternal grandmother; Anxiety disorder in her brother; Basal cell carcinoma in her mother; Colon polyps in her father; Diabetes type II in her father; Heart murmur in her brother; Hyperlipidemia in her mother; Hypertension in her father; Pancreatic cancer in her maternal grandfather.     Social History: She  reports that she has never smoked. She has never been exposed to tobacco smoke. She has never used smokeless tobacco. She reports current alcohol use of about 1.0 standard drink of alcohol per week. She reports that she does not use drugs.    Home Medications:  aspirin, atorvastatin, buPROPion SR, escitalopram, fexofenadine, nitroglycerin, ticagrelor, and traZODone    Allergies:  She has no known allergies.    Objective    Objective     Vitals:    Temp:  [97.1 °F (36.2 °C)] 97.1 °F (36.2 °C)  Heart Rate:  [79] 79  Resp:  [16]  16  BP: (94)/(62) 94/62    Physical Exam  Vitals and nursing note reviewed.   Constitutional:       General: She is not in acute distress.     Appearance: Normal appearance.   HENT:      Head: Normocephalic and atraumatic.      Right Ear: Tympanic membrane normal.      Left Ear: Tympanic membrane normal.      Nose: Nose normal.      Mouth/Throat:      Mouth: Mucous membranes are moist.   Eyes:      Extraocular Movements: Extraocular movements intact.      Conjunctiva/sclera: Conjunctivae normal.      Pupils: Pupils are equal, round, and reactive to light.   Cardiovascular:      Rate and Rhythm: Normal rate and regular rhythm.      Heart sounds: Normal heart sounds.   Pulmonary:      Effort: Pulmonary effort is normal. No respiratory distress.      Breath sounds: Normal breath sounds.   Abdominal:      General: Bowel sounds are normal.      Palpations: Abdomen is soft.   Musculoskeletal:         General: Normal range of motion.      Cervical back: Normal range of motion.      Comments: Moving all extremities    Skin:     General: Skin is warm and dry.   Neurological:      General: No focal deficit present.      Mental Status: She is alert and oriented to person, place, and time.   Psychiatric:         Mood and Affect: Mood normal.         Behavior: Behavior normal.         Thought Content: Thought content normal.         Judgment: Judgment normal.       EKG: normal EKG, normal sinus rhythm, unchanged from previous tracings.  Septal myocardial infarction- similar compared to previous- 8/2024     Assessment & Plan   Assessment / Plan     Brief Patient Summary:  Susan Byers is a 44 y.o. female who presents for preoperative evaluation.  Diagnosis Discussed   Continue to monitor   Plenty of fluids, monitor diet and exercise   EKG- today - stable from previous- no acute change   Cleared from Medicine standpoint for surgery   Will need clearance from Cardiology and anticoagulation guidance - Dr. Rehman   If symptoms  worsen or persist please seek further evaluation       Plan:   cleared for surgery- will need final clearance per Cardiology.      Tangela Hoffman MD

## 2025-03-26 ENCOUNTER — OFFICE VISIT (OUTPATIENT)
Dept: OBSTETRICS AND GYNECOLOGY | Facility: CLINIC | Age: 45
End: 2025-03-26
Payer: COMMERCIAL

## 2025-03-26 VITALS
HEIGHT: 67 IN | DIASTOLIC BLOOD PRESSURE: 60 MMHG | WEIGHT: 159 LBS | BODY MASS INDEX: 24.96 KG/M2 | SYSTOLIC BLOOD PRESSURE: 92 MMHG

## 2025-03-26 DIAGNOSIS — Z01.419 ROUTINE GYNECOLOGICAL EXAMINATION: Primary | ICD-10-CM

## 2025-03-26 DIAGNOSIS — Z12.31 ENCOUNTER FOR SCREENING MAMMOGRAM FOR MALIGNANT NEOPLASM OF BREAST: ICD-10-CM

## 2025-03-26 NOTE — PROGRESS NOTES
Gynecologic Annual Exam Note          GYN Annual Exam     Annual Exam        Subjective     HPI  Susan Byers is a 44 y.o. female, , who presents for annual well woman exam as a new patient. There were no changes to her medical or surgical history since her last visit..  No LMP recorded (lmp unknown). Patient has had an ablation.   Her periods are absent since ablation in 2018.  .     Marital Status: . She is sexually active. She has not had new partners.. STD testing recommendations have been explained to the patient and she declines STD testing.    The patient would like to discuss the following complaints today: none    Additional OB/GYN History   contraceptive methods: Tubal ligation  Desires to: continue contraception  History of migraines: no    Last Pap : 2/10/2022. Result: negative. HPV: unknown .   Last Completed Pap Smear            Upcoming       PAP SMEAR (Every 3 Years) Next due on 3/26/2028      2025  LIQUID-BASED PAP SMEAR WITH HPV GENOTYPING REGARDLESS OF INTERPRETATION (BILL,COR,MAD)    2015  Done - normal per pt                          History of abnormal Pap smear: no  Family history of uterine, colon, breast, or ovarian cancer: no  Performs monthly Self-Breast Exam: yes  Last mammogram: 9/3/2024. Done at . There is a copy in the chart. negative    Last Completed Mammogram            Awaiting Completion       MAMMOGRAM (Every 2 Years) Order placed this encounter      2025  Order placed for Mammo Screening Digital Tomosynthesis Bilateral With CAD by Judith Mchugh MD    2024  MAMMO DIAGNOSTIC DIGITAL TOMOSYNTHESIS BILATERAL W CAD    2023  MAMMO DIAGNOSTIC DIGITAL TOMOSYNTHESIS BILATERAL W CAD    2023  MAMMO DIAGNOSTIC DIGITAL TOMOSYNTHESIS RIGHT W CAD    2022  MAMMO DIAGNOSTIC DIGITAL TOMOSYNTHESIS BILATERAL W CAD     Only the first 5 history entries have been loaded, but more history exists.                           Colonoscopy: has never had a colonoscopy or cologuard  Exercises Regularly: yes  Feelings of Anxiety or Depression: yes - treated  Tobacco Usage?: No       Current Outpatient Medications:     aspirin 81 MG EC tablet, Take 1 tablet by mouth Daily., Disp: , Rfl:     atorvastatin (LIPITOR) 40 MG tablet, Take 1 tablet by mouth Daily., Disp: , Rfl:     buPROPion SR (WELLBUTRIN SR) 150 MG 12 hr tablet, Take 1 tablet by mouth Daily., Disp: 90 tablet, Rfl: 3    escitalopram (LEXAPRO) 10 MG tablet, Take 1 tablet by mouth Daily., Disp: 90 tablet, Rfl: 3    fexofenadine (ALLEGRA ALLERGY) 180 MG tablet, Take 1 tablet by mouth Daily., Disp: , Rfl:     nitroglycerin (NITROSTAT) 0.4 MG SL tablet, Place 1 tablet under the tongue Every 5 (Five) Minutes As Needed., Disp: , Rfl:     ticagrelor (BRILINTA) 90 MG tablet tablet, Take 1 tablet by mouth 2 (Two) Times a Day., Disp: , Rfl:     traZODone (DESYREL) 50 MG tablet, Take 0.5 tablets by mouth., Disp: , Rfl:      Patient denies the need for medication refills today.    OB History          2    Para   2    Term   2            AB        Living   2         SAB        IAB        Ectopic        Molar        Multiple        Live Births   2                Past Medical History:   Diagnosis Date    Anxiety     History of hepatitis A vaccination     History of hepatitis B vaccination     Hx of esophagogastroduodenoscopy     - normal with the exception of hypotensive LES    Mood disorder     Myocardial infarction     Transverse sinus thrombosis     - etiology ocp's    Varicella infection, resolved         Past Surgical History:   Procedure Laterality Date    CARDIAC CATHETERIZATION  2024    Successful IVUS guided PTCA and drug-eluting stent placement x 1 to the mid LAD for what is most likely SCAD. This was complicated by vessel perforation, likely into a nearby fistula, Which was sealed with the use of a covered stent.     SECTION      10/11  "and 4/13    RHINOPLASTY      1998    TUBAL ABDOMINAL LIGATION      4/13       Health Maintenance   Topic Date Due    Annual Gynecologic Pelvic and Breast Exam  Never done    COVID-19 Vaccine (5 - 2024-25 season) 09/01/2024    INFLUENZA VACCINE  07/01/2025    ANNUAL PHYSICAL  11/08/2025    MAMMOGRAM  09/03/2026    COLORECTAL CANCER SCREENING  09/21/2027    PAP SMEAR  03/26/2028    TDAP/TD VACCINES (4 - Td or Tdap) 11/14/2028    HEPATITIS C SCREENING  Completed    Pneumococcal Vaccine 0-49  Aged Out       The additional following portions of the patient's history were reviewed and updated as appropriate: allergies, current medications, past family history, past medical history, past social history, past surgical history, and problem list.    Review of Systems   All other systems reviewed and are negative.        I have reviewed and agree with the HPI, ROS, and historical information as entered above. Judith Mchugh MD          Objective   BP 92/60   Ht 170.2 cm (67\")   Wt 72.1 kg (159 lb)   LMP  (LMP Unknown)   BMI 24.90 kg/m²     Physical Exam  General:  well developed; well nourished  no acute distress  Skin:  No suspicious lesions seen  Thyroid: normal to inspection and palpation  Breasts:  Examined in supine position  Symmetric without masses or skin dimpling  Nipples normal without inversion, lesions or discharge  There are no palpable axillary nodes  CVS: RRR, no M/R/G, distal pulses wnl  Resp: CTAB, No W/R/R  Abdomen: soft, non-tender; no masses  no umbilical or inguinal hernias are present  no hepato-splenomegaly  Pelvis: Clinical staff was present for exam  External genitalia:  normal appearance of the external genitalia including Bartholin's and [place]'s glands.  Urethra: no masses or tenderness  Urethral meatus: normal size;  No lesions or signs of prolapse  Bladder: non tender to palpation, no masses, no prolapse  Vagina:  normal pink mucosa without prolapse or lesions.  Cervix:  normal " appearance.  Uterus:  normal size, shape and consistency.  Adnexa:  normal bimanual exam of the adnexa.  Perineum/Anus: normal appearance, no external hemorrhoids  Ext: 2+ pulses, no edema      Assessment and Plan    Problem List Items Addressed This Visit       Encounter for screening mammogram for malignant neoplasm of breast    Relevant Orders    Mammo Screening Digital Tomosynthesis Bilateral With CAD    Routine gynecological examination - Primary    Relevant Orders    LIQUID-BASED PAP SMEAR WITH HPV GENOTYPING REGARDLESS OF INTERPRETATION (BILL,COR,MAD) (Completed)       GYN annual well woman exam.   Pap guidelines reviewed.  Reviewed monthly self breast exams.  Instructed to call with lumps, pain, or breast discharge.    Ordered Mammogram today  Reviewed exercise as a preventative health measures.   Symptoms of menopausal transition reviewed with patient.   Return in about 1 year (around 3/26/2026) for Appropriate for followup with NP as desired..     Judith Mchugh MD  03/26/2025

## 2025-03-27 LAB — REF LAB TEST METHOD: NORMAL

## 2025-04-24 ENCOUNTER — OFFICE VISIT (OUTPATIENT)
Dept: CARDIOLOGY | Facility: CLINIC | Age: 45
End: 2025-04-24
Payer: COMMERCIAL

## 2025-04-24 VITALS
WEIGHT: 155.8 LBS | HEART RATE: 51 BPM | DIASTOLIC BLOOD PRESSURE: 60 MMHG | SYSTOLIC BLOOD PRESSURE: 92 MMHG | OXYGEN SATURATION: 100 % | BODY MASS INDEX: 24.45 KG/M2 | HEIGHT: 67 IN

## 2025-04-24 DIAGNOSIS — Z87.74 S/P PATENT FORAMEN OVALE CLOSURE: ICD-10-CM

## 2025-04-24 DIAGNOSIS — I25.42 SPONTANEOUS DISSECTION OF CORONARY ARTERY: Primary | ICD-10-CM

## 2025-04-24 PROCEDURE — 99214 OFFICE O/P EST MOD 30 MIN: CPT | Performed by: INTERNAL MEDICINE

## 2025-04-24 NOTE — PROGRESS NOTES
OFFICE VISIT  NOTE  [facility] CARDIOLOGY      Name: Susan Byers    Date: 2025  MRN:  [id number]  :  1980      REFERRING/PRIMARY PROVIDER:  Tangela Hoffman MD    Chief Complaint   Patient presents with    Spontaneous dissection of coronary artery       HPI: Susan Byers is a 44 y.o. female who presents for spontaneous coronary artery dissection 2024.  NSTEMI at [facility] in [place] cath showed LAD stenosis likely secondary to scad, 2.5 x 48 mm Synergy AZAR placed to the LAD there was extravasation of dye therefore a covered stent was placed which was a 3.0 by 15mm Paparus stent.  She presented to [facility] ER 2024 with similar symptoms while doing Pilates, troponin was minimally elevated, with a flat trend going from 78 down to 63 with no EKG changes.  Limited echo showed EF 50 to 55% with apical and distal anterior septal hypokinesis consistent with previous MI.    Comes for preop cardiovascular assessment prior to plastic surgery, having tummy tuck and breast augmentation on 2025 with Dr. Beto Moreira.  She is doing well denies chest pain palpitations or shortness of breath.  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  She has easy bruising with Brilinta.    Past Medical History:   Diagnosis Date    Anxiety     History of hepatitis A vaccination     History of hepatitis B vaccination     Hx of esophagogastroduodenoscopy     - normal with the exception of hypotensive LES    Mood disorder     Myocardial infarction     Transverse sinus thrombosis     - etiology ocp's    Varicella infection, resolved        Past Surgical History:   Procedure Laterality Date    CARDIAC CATHETERIZATION  2024    Successful IVUS guided PTCA and drug-eluting stent placement x 1 to the mid LAD for what is most likely SCAD. This was complicated by vessel perforation, likely into a nearby fistula, Which was  sealed with the use of a covered stent.     SECTION      10/11 and     RHINOPLASTY          TUBAL ABDOMINAL LIGATION             Social History     Socioeconomic History    Marital status:     Number of children: 2   Tobacco Use    Smoking status: Never     Passive exposure: Never    Smokeless tobacco: Never   Vaping Use    Vaping status: Never Used   Substance and Sexual Activity    Alcohol use: Yes     Alcohol/week: 1.0 standard drink of alcohol     Types: 1 Glasses of wine per week    Drug use: Never    Sexual activity: Yes     Partners: Male     Birth control/protection: Tubal ligation       Family History   Problem Relation Age of Onset    Basal cell carcinoma Mother     Hyperlipidemia Mother     Colon polyps Father     Diabetes type II Father     Hypertension Father     Anxiety disorder Brother     Heart murmur Brother     Alzheimer's disease Maternal Grandmother     Pancreatic cancer Maternal Grandfather         ROS:   Constitutional no fever,  no weight loss   Skin no rash, no subcutaneous nodules   Otolaryngeal no difficulty swallowing   Cardiovascular See HPI   Pulmonary no cough, no sputum production   Gastrointestinal no constipation, no diarrhea   Genitourinary no dysuria, no hematuria   Hematologic no easy bruisability, no abnormal bleeding   Musculoskeletal no muscle pain   Neurologic no dizziness, no falls         No Known Allergies      Current Outpatient Medications:     aspirin 81 MG EC tablet, Take 1 tablet by mouth Daily., Disp: , Rfl:     atorvastatin (LIPITOR) 40 MG tablet, Take 1 tablet by mouth Daily., Disp: , Rfl:     buPROPion SR (WELLBUTRIN SR) 150 MG 12 hr tablet, Take 1 tablet by mouth Daily., Disp: 90 tablet, Rfl: 3    escitalopram (LEXAPRO) 10 MG tablet, Take 1 tablet by mouth Daily., Disp: 90 tablet, Rfl: 3    fexofenadine (ALLEGRA ALLERGY) 180 MG tablet, Take 1 tablet by mouth Daily., Disp: , Rfl:     nitroglycerin (NITROSTAT) 0.4 MG SL tablet, Place 1  "tablet under the tongue Every 5 (Five) Minutes As Needed., Disp: , Rfl:     ticagrelor (BRILINTA) 90 MG tablet tablet, Take 1 tablet by mouth 2 (Two) Times a Day., Disp: , Rfl:     traZODone (DESYREL) 50 MG tablet, Take 0.5 tablets by mouth., Disp: , Rfl:     Vitals:    04/24/25 1002   BP: 92/60   BP Location: Right arm   Patient Position: Sitting   Cuff Size: Adult   Pulse: 51   SpO2: 100%   Weight: 70.7 kg (155 lb 12.8 oz)   Height: 170.2 cm (67\")     Body mass index is 24.4 kg/m².    PHYSICAL EXAM:      General Appearance:   · well developed  · well nourished  Neck:  · thyroid not enlarged  · supple  Respiratory:  · no respiratory distress  · normal breath sounds  · no rales  Cardiovascular:  · no jugular venous distention  · regular rhythm  · apical impulse normal  · S1 normal, S2 normal  · no S3, no S4   · no murmur  · no rub, no thrill  · carotid pulses normal; no bruit  · pedal pulses normal  · lower extremity edema: none    Skin:   warm, dry    RESULTS:   Procedures    Results for orders placed during the hospital encounter of 08/19/24    Adult Transthoracic Echo Limited W/ Cont if Necessary Per Protocol    Interpretation Summary    Left ventricular systolic function is low normal. Left ventricular ejection fraction appears to be 51 - 55%.    The following left ventricular wall segments are hypokinetic: apical anterior, apical septal and apex hypokinetic.    No significant change compared to previous study from 8/8/2024        Labs:  Lab Results   Component Value Date    CHOL 119 12/11/2024    TRIG 64 12/11/2024    HDL 55 12/11/2024    LDL 50 12/11/2024    AST 18 12/11/2024    ALT 11 12/11/2024     Lab Results   Component Value Date    HGBA1C 5.00 12/11/2024     No components found for: \"CREATINININE\"  No results found for: \"EGFRIFNONA\"    Most recent PCP note, imaging tests, and labs reviewed.    ASSESSMENT:  Problem List Items Addressed This Visit       Spontaneous dissection of coronary artery - Primary "    Overview   Cardiac cath 8/8/2024: 1. Successful IVUS guided PTCA and drug-eluting stent placement x 1 to the mid  LAD for what is most likely SCAD. This was complicated by vessel perforation, likely into a nearby fistula, Which was sealed with the use of a covered stent.            S/P patent foramen ovale closure       PLAN:    1.  Scad:  NSTEMI 8/8/2024 requiring stenting of the mid LAD at [facility] complicated by perforation requiring covered stent.  Limited echo 8/20/2024 showed EF 50 to 55% with apical and distal anterior septal hypokinesis consistent with previous MI  Continue aspirin 81 mg daily  Discontinue Brilinta 7/31/2025 5 days prior to surgery.  Avoid strenuous activity  Low-level aerobic exercise okay.        2.  Hyperlipidemia:  Lipid panel 8/2024 total cholesterol 201   Excellent lipids on statin therapy, 12/2024, LDL 50, total cholesterol 119  Continue atorvastatin 40 mg daily    3.  Palpitations:  Likely related to influenza, continue monitoring with exercise and hydration.  Resolved    4.  Preop cardiovascular assessment:  Low cardiovascular risk, she is asymptomatic with daily activities  Stop Brilinta permanently 7/31/2025, approximately 5 days prior to surgery  Continue uninterrupted aspirin 81 mg daily  No beta-blocker due to bradycardia    Return to clinic in 6 months, or sooner as needed.    Thank you for the opportunity to share in the care of your patient; please do not hesitate to call me with any questions.     Demond Rehman MD, [facility], [facility]  Office: [phone] [address] [place] [address]    04/24/25

## 2025-07-08 ENCOUNTER — TELEPHONE (OUTPATIENT)
Dept: INTERNAL MEDICINE | Facility: CLINIC | Age: 45
End: 2025-07-08
Payer: COMMERCIAL

## 2025-07-08 NOTE — TELEPHONE ENCOUNTER
RAFAELA WITH [facility] CALLED AND IS REQUESTING CURRENT LABS (CBC, BMP, PT, INR, PTT); MEDICAL CLEARANCE FORM (EVEN IF IT JUST SAYS CLEARED FOR SURGERY-ALREADY HAS THE FORM FROM CARDIOLOGY);EKG, AND MAMMO RESULTS IF POSSIBLE. PATIENT WAS OF THE UNDERSTANDING THAT THIS HAD ALREADY BEEN DONE.  PLEASE FAX THESE [phone].    RAFAELA: [phone]

## 2025-07-08 NOTE — TELEPHONE ENCOUNTER
Spoke w/ Bette, let know last labs done in Dec 2024. Did fax the EKG from March. Let know pre op done in March and pt didn't have labs done at that appt.

## 2025-07-09 ENCOUNTER — LAB (OUTPATIENT)
Dept: LAB | Facility: HOSPITAL | Age: 45
End: 2025-07-09
Payer: COMMERCIAL

## 2025-07-09 DIAGNOSIS — Z11.1 SCREENING-PULMONARY TB: ICD-10-CM

## 2025-07-09 DIAGNOSIS — Z01.812 PRE-OPERATIVE LABORATORY EXAMINATION: Primary | ICD-10-CM

## 2025-07-09 DIAGNOSIS — Z11.1 SCREENING-PULMONARY TB: Primary | ICD-10-CM

## 2025-07-09 DIAGNOSIS — R35.0 URINARY FREQUENCY: ICD-10-CM

## 2025-07-09 LAB
ALBUMIN SERPL-MCNC: 4.5 G/DL (ref 3.5–5.2)
ALBUMIN/GLOB SERPL: 2 G/DL
ALP SERPL-CCNC: 65 U/L (ref 39–117)
ALT SERPL W P-5'-P-CCNC: 14 U/L (ref 1–33)
ANION GAP SERPL CALCULATED.3IONS-SCNC: 9 MMOL/L (ref 5–15)
APTT PPP: 25.9 SECONDS (ref 22–39)
AST SERPL-CCNC: 18 U/L (ref 1–32)
BACTERIA UR QL AUTO: NORMAL /HPF
BILIRUB SERPL-MCNC: 0.4 MG/DL (ref 0–1.2)
BILIRUB UR QL STRIP: NEGATIVE
BUN SERPL-MCNC: 12.9 MG/DL (ref 6–20)
BUN/CREAT SERPL: 17.7 (ref 7–25)
CALCIUM SPEC-SCNC: 9.1 MG/DL (ref 8.6–10.5)
CHLORIDE SERPL-SCNC: 105 MMOL/L (ref 98–107)
CLARITY UR: CLEAR
CO2 SERPL-SCNC: 26 MMOL/L (ref 22–29)
COLOR UR: YELLOW
CREAT SERPL-MCNC: 0.73 MG/DL (ref 0.57–1)
DEPRECATED RDW RBC AUTO: 40.8 FL (ref 37–54)
EGFRCR SERPLBLD CKD-EPI 2021: 105.5 ML/MIN/1.73
ERYTHROCYTE [DISTWIDTH] IN BLOOD BY AUTOMATED COUNT: 12.1 % (ref 12.3–15.4)
GLOBULIN UR ELPH-MCNC: 2.3 GM/DL
GLUCOSE SERPL-MCNC: 89 MG/DL (ref 65–99)
GLUCOSE UR STRIP-MCNC: NEGATIVE MG/DL
HCT VFR BLD AUTO: 38 % (ref 34–46.6)
HGB BLD-MCNC: 12.6 G/DL (ref 12–15.9)
HGB UR QL STRIP.AUTO: NEGATIVE
HYALINE CASTS UR QL AUTO: NORMAL /LPF
INR PPP: 1.07 (ref 0.89–1.12)
KETONES UR QL STRIP: NEGATIVE
LEUKOCYTE ESTERASE UR QL STRIP.AUTO: NEGATIVE
MCH RBC QN AUTO: 30.7 PG (ref 26.6–33)
MCHC RBC AUTO-ENTMCNC: 33.2 G/DL (ref 31.5–35.7)
MCV RBC AUTO: 92.5 FL (ref 79–97)
NITRITE UR QL STRIP: NEGATIVE
PH UR STRIP.AUTO: 6.5 [PH] (ref 5–8)
PLATELET # BLD AUTO: 352 10*3/MM3 (ref 140–450)
PMV BLD AUTO: 10.3 FL (ref 6–12)
POTASSIUM SERPL-SCNC: 4.1 MMOL/L (ref 3.5–5.2)
PROT SERPL-MCNC: 6.8 G/DL (ref 6–8.5)
PROT UR QL STRIP: NEGATIVE
PROTHROMBIN TIME: 14.6 SECONDS (ref 12.2–15.3)
RBC # BLD AUTO: 4.11 10*6/MM3 (ref 3.77–5.28)
RBC # UR STRIP: NORMAL /HPF
REF LAB TEST METHOD: NORMAL
SODIUM SERPL-SCNC: 140 MMOL/L (ref 136–145)
SP GR UR STRIP: 1.01 (ref 1–1.03)
SQUAMOUS #/AREA URNS HPF: NORMAL /HPF
UROBILINOGEN UR QL STRIP: NORMAL
WBC # UR STRIP: NORMAL /HPF
WBC NRBC COR # BLD AUTO: 6.04 10*3/MM3 (ref 3.4–10.8)

## 2025-07-09 PROCEDURE — 86480 TB TEST CELL IMMUN MEASURE: CPT

## 2025-07-09 PROCEDURE — 36415 COLL VENOUS BLD VENIPUNCTURE: CPT

## 2025-07-09 PROCEDURE — 81001 URINALYSIS AUTO W/SCOPE: CPT

## 2025-07-09 PROCEDURE — 85610 PROTHROMBIN TIME: CPT

## 2025-07-09 PROCEDURE — 85027 COMPLETE CBC AUTOMATED: CPT

## 2025-07-09 PROCEDURE — 85730 THROMBOPLASTIN TIME PARTIAL: CPT

## 2025-07-09 PROCEDURE — 80053 COMPREHEN METABOLIC PANEL: CPT

## 2025-07-09 PROCEDURE — 87086 URINE CULTURE/COLONY COUNT: CPT

## 2025-07-11 LAB
BACTERIA SPEC AEROBE CULT: NO GROWTH
GAMMA INTERFERON BACKGROUND BLD IA-ACNC: 0.04 IU/ML
M TB IFN-G BLD-IMP: NEGATIVE
M TB IFN-G CD4+ BCKGRND COR BLD-ACNC: 0.06 IU/ML
M TB IFN-G CD4+CD8+ BCKGRND COR BLD-ACNC: 0.05 IU/ML
MITOGEN IGNF BCKGRD COR BLD-ACNC: >10 IU/ML
QUANTIFERON INCUBATION: NORMAL
SERVICE CMNT-IMP: NORMAL